# Patient Record
Sex: MALE | Race: WHITE | NOT HISPANIC OR LATINO | Employment: UNEMPLOYED | URBAN - METROPOLITAN AREA
[De-identification: names, ages, dates, MRNs, and addresses within clinical notes are randomized per-mention and may not be internally consistent; named-entity substitution may affect disease eponyms.]

---

## 2018-06-08 ENCOUNTER — OFFICE VISIT (OUTPATIENT)
Dept: INTERNAL MEDICINE | Facility: CLINIC | Age: 58
End: 2018-06-08
Payer: MEDICAID

## 2018-06-08 VITALS
RESPIRATION RATE: 16 BRPM | DIASTOLIC BLOOD PRESSURE: 66 MMHG | OXYGEN SATURATION: 96 % | HEIGHT: 70 IN | TEMPERATURE: 98.1 F | WEIGHT: 161.1 LBS | BODY MASS INDEX: 23.06 KG/M2 | SYSTOLIC BLOOD PRESSURE: 106 MMHG | HEART RATE: 95 BPM

## 2018-06-08 DIAGNOSIS — Z72.0 TOBACCO ABUSE: ICD-10-CM

## 2018-06-08 DIAGNOSIS — F60.3 BORDERLINE PERSONALITY DISORDER (H): ICD-10-CM

## 2018-06-08 DIAGNOSIS — K21.9 GASTROESOPHAGEAL REFLUX DISEASE WITHOUT ESOPHAGITIS: ICD-10-CM

## 2018-06-08 DIAGNOSIS — Z13.6 CARDIOVASCULAR SCREENING; LDL GOAL LESS THAN 160: ICD-10-CM

## 2018-06-08 DIAGNOSIS — F10.11 ALCOHOL ABUSE, IN REMISSION: ICD-10-CM

## 2018-06-08 DIAGNOSIS — Z12.11 COLON CANCER SCREENING: ICD-10-CM

## 2018-06-08 DIAGNOSIS — J43.9 PULMONARY EMPHYSEMA, UNSPECIFIED EMPHYSEMA TYPE (H): Primary | ICD-10-CM

## 2018-06-08 DIAGNOSIS — F25.0 SCHIZOAFFECTIVE DISORDER, BIPOLAR TYPE (H): ICD-10-CM

## 2018-06-08 PROBLEM — F10.10 ALCOHOL ABUSE: Status: ACTIVE | Noted: 2018-01-31

## 2018-06-08 PROBLEM — J44.9 COPD MIXED TYPE (H): Status: ACTIVE | Noted: 2018-01-10

## 2018-06-08 LAB
FEF 25/75: NORMAL
FEV-1: NORMAL
FEV1/FVC: NORMAL
FVC: NORMAL

## 2018-06-08 PROCEDURE — 99204 OFFICE O/P NEW MOD 45 MIN: CPT | Mod: 25 | Performed by: INTERNAL MEDICINE

## 2018-06-08 PROCEDURE — 94010 BREATHING CAPACITY TEST: CPT | Performed by: INTERNAL MEDICINE

## 2018-06-08 RX ORDER — NICOTINE POLACRILEX 4 MG/1
20 GUM, CHEWING ORAL DAILY
Qty: 30 TABLET | Refills: 11 | Status: SHIPPED | OUTPATIENT
Start: 2018-06-08 | End: 2019-07-11

## 2018-06-08 RX ORDER — ALBUTEROL SULFATE 90 UG/1
1-2 AEROSOL, METERED RESPIRATORY (INHALATION) EVERY 6 HOURS PRN
Qty: 1 INHALER | Refills: 11 | Status: SHIPPED | OUTPATIENT
Start: 2018-06-08 | End: 2019-08-05

## 2018-06-08 RX ORDER — TRIAMCINOLONE ACETONIDE 1 MG/G
CREAM TOPICAL
COMMUNITY
Start: 2018-02-14

## 2018-06-08 RX ORDER — ALBUTEROL SULFATE 90 UG/1
1-2 AEROSOL, METERED RESPIRATORY (INHALATION)
COMMUNITY
Start: 2018-04-25 | End: 2018-06-08

## 2018-06-08 RX ORDER — ARIPIPRAZOLE 10 MG/1
10 TABLET ORAL
COMMUNITY
Start: 2018-04-24

## 2018-06-08 RX ORDER — AMITRIPTYLINE HYDROCHLORIDE 100 MG/1
100 TABLET ORAL
COMMUNITY
Start: 2018-04-24

## 2018-06-08 NOTE — PROGRESS NOTES
SUBJECTIVE:   Sandro Arndt is a 57 year old male who presents to clinic today for the following health issues:    Patient is new to our clinic today.  He is been recently admitted to a new group home in the [facility].  States he is long standing history of mental health  Problems, substance abuse homelessness.  He has been in and out of [facility], different group homes over the last 8 years.    He has no acute complaints today he was recently seen in April had an [facility] for a pre-admission physical.  Review of that note seems as if it was quite brief examination.     Past Medical History:   Diagnosis Date     Borderline high cholesterol      Chronic neck pain     s/p traumatic fracture     Polysubstance abuse     CD Rx x 2     Pulmonary function studies abnormal     abn. DCLO     Schizoaffective disorder, bipolar type (H)      Tobacco abuse      Past Surgical History:   Procedure Laterality Date     C OPEN RX MANDIBLE FX       Current Outpatient Prescriptions   Medication Sig Dispense Refill     albuterol (PROAIR HFA/PROVENTIL HFA/VENTOLIN HFA) 108 (90 Base) MCG/ACT Inhaler Inhale 1-2 puffs into the lungs       amitriptyline (ELAVIL) 100 MG tablet Take 100 mg by mouth       ARIPiprazole (ABILIFY) 10 MG tablet Take 10 mg by mouth       LAMICTAL 100 MG PO TABS 1 TABLET DAILY       LamoTRIgine (LAMICTAL PO) Take 200 mg by mouth At Bedtime       OMEPRAZOLE 20 MG PO TBEC 1 TABLET DAILY       tiotropium (SPIRIVA RESPIMAT) 2.5 MCG/ACT inhalation aerosol Inhale 2 puffs into the lungs daily 4 g 11     TRAZODONE HCL PO Take 100 mg by mouth At Bedtime       triamcinolone (KENALOG) 0.1 % cream Apply  topically to affected area(s) 2 times daily.       Allergies as of 06/08/2018 - Crescencio as Reviewed 06/08/2018   Allergen Reaction Noted     Codeine Hives 04/23/2010     Ibuprofen GI Disturbance 01/10/2018     Neosporin Rash 04/23/2010     Amoxicillin Swelling, Rash, and Nausea and Vomiting 10/12/2015       Social History  "    Social History     Marital status: Single     Spouse name: N/A     Number of children: N/A     Years of education: N/A     Occupational History     Not on file.     Social History Main Topics     Smoking status: Current Every Day Smoker     Packs/day: 0.50     Years: 30.00     Types: Cigarettes     Smokeless tobacco: Never Used     Alcohol use No     Drug use: No     Sexual activity: Yes     Partners: Female     Other Topics Concern     Not on file     Social History Narrative       Family History   Problem Relation Age of Onset     Psychotic Disorder Mother      Depression Mother        Problem list and histories reviewed & adjusted, as indicated.  Additional history: as documented    Labs reviewed in EPIC    Reviewed and updated as needed this visit by clinical staff  Allergies  Meds       Reviewed and updated as needed this visit by Provider         ROS:  Constitutional, neuro, ENT, endocrine, pulmonary, cardiac, gastrointestinal, genitourinary, musculoskeletal, integument and psychiatric systems are negative, except as otherwise noted.    OBJECTIVE:                                                    /66  Pulse 95  Temp 98.1  F (36.7  C) (Oral)  Resp 16  Ht 5' 9.5\" (1.765 m)  Wt 161 lb 1.6 oz (73.1 kg)  SpO2 96%  BMI 23.45 kg/m2  Body mass index is 23.45 kg/(m^2).  GENERAL APPEARANCE: alert and no distress  HENT: nose and mouth without ulcers or lesions  NECK: no adenopathy, no asymmetry, masses, or scars and thyroid normal to palpation  RESP: lungs clear to auscultation - no rales, rhonchi or wheezes  CV: regular rates and rhythm, normal S1 S2, no S3 or S4 and no murmur, click or rub  MS: extremities normal- no gross deformities noted  SKIN: no suspicious lesions or rashes    Diagnostic test results:  PFTs: Moderate obstruction     ASSESSMENT/PLAN:                                                    1. Pulmonary emphysema, unspecified emphysema type (H)  Rather than use the Atrovent 4 times a " day will substitute Spiriva and continue the albuterol as needed.  Encourage smoking cessation as well.  - tiotropium (SPIRIVA RESPIMAT) 2.5 MCG/ACT inhalation aerosol; Inhale 2 puffs into the lungs daily  Dispense: 4 g; Refill: 11    Alcohol abuse, in remission  Borderline personality disorder  Schizoaffective disorder, bipolar type (H)  Tobacco abuse   - per psychiatry    Colon Ca screening  - colonoscopy     Cory Gleason MD  [facility]

## 2018-06-08 NOTE — MR AVS SNAPSHOT
"              After Visit Summary   6/8/2018    Sandro Arndt    MRN: [id number]           Patient Information     Date Of Birth          1960        Visit Information        Provider Department      6/8/2018 11:20 AM Cory Gleason MD [facility]        Today's Diagnoses     Pulmonary emphysema, unspecified emphysema type (H)    -  1       Follow-ups after your visit        Who to contact     If you have questions or need follow up information about today's clinic visit or your schedule please contact [facility] directly at [phone].  Normal or non-critical lab and imaging results will be communicated to you by MyChart, letter or phone within 4 business days after the clinic has received the results. If you do not hear from us within 7 days, please contact the clinic through MyChart or phone. If you have a critical or abnormal lab result, we will notify you by phone as soon as possible.  Submit refill requests through "[employer] or call your pharmacy and they will forward the refill request to us. Please allow 3 business days for your refill to be completed.          Additional Information About Your Visit        Care EveryWhere ID     This is your Care EveryWhere ID. This could be used by other organizations to access your [place] medical records  [id number]        Your Vitals Were     Pulse Temperature Respirations Height Pulse Oximetry BMI (Body Mass Index)    95 98.1  F (36.7  C) (Oral) 16 5' 9.5\" (1.765 m) 96% 23.45 kg/m2       Blood Pressure from Last 3 Encounters:   06/08/18 106/66   06/30/12 118/79   04/23/10 152/77    Weight from Last 3 Encounters:   06/08/18 161 lb 1.6 oz (73.1 kg)   06/30/12 138 lb (62.6 kg)   04/23/10 142 lb (64.4 kg)              We Performed the Following     Spirometry, Breathing Capacity: Normal Order, Clinic Performed          Today's Medication Changes          These changes are accurate as of 6/8/18 12:29 PM.  If " you have any questions, ask your nurse or doctor.               Start taking these medicines.        Dose/Directions    tiotropium 2.5 MCG/ACT inhalation aerosol   Commonly known as:  SPIRIVA RESPIMAT   Used for:  Pulmonary emphysema, unspecified emphysema type (H)   Started by:  Cory Gleason MD        Dose:  2 puff   Inhale 2 puffs into the lungs daily   Quantity:  4 g   Refills:  11         Stop taking these medicines if you haven't already. Please contact your care team if you have questions.     ipratropium 17 MCG/ACT Inhaler   Commonly known as:  ATROVENT HFA   Stopped by:  Cory Gleason MD                Where to get your medicines      These medications were sent to [facility] [address]  [address] [address]     Phone:  [phone]     tiotropium 2.5 MCG/ACT inhalation aerosol                Primary Care Provider Office Phone # Fax #    Yousufsilvina Bennie Sosa [phone]       [address] [address]        Equal Access to Services     [facility]: Hadii aad ku hadasho Soomaali, waaxda luqadaha, qaybta kaalmada adeegyada, waxay idiin hayrenann zbigniew owen . So [facility] [phone].    ATENCIÓN: Si habla español, tiene a jorgensen disposición servicios gratuitos de asistencia lingüística. Llame al [phone].    We comply with applicable federal civil rights laws and [place] laws. We do not discriminate on the basis of race, color, national origin, age, disability, sex, sexual orientation, or gender identity.            Thank you!     Thank you for choosing [facility]  for your care. Our goal is always to provide you with excellent care. Hearing back from our patients is one way we can continue to improve our services. Please take a few minutes to complete the written survey that you may receive in the mail after your visit with us. Thank you!             Your  Updated Medication List - Protect others around you: Learn how to safely use, store and throw away your medicines at www.disposemymeds.org.          This list is accurate as of 6/8/18 12:29 PM.  Always use your most recent med list.                   Brand Name Dispense Instructions for use Diagnosis    albuterol 108 (90 Base) MCG/ACT Inhaler    PROAIR HFA/PROVENTIL HFA/VENTOLIN HFA     Inhale 1-2 puffs into the lungs        amitriptyline 100 MG tablet    ELAVIL     Take 100 mg by mouth        ARIPiprazole 10 MG tablet    ABILIFY     Take 10 mg by mouth        * LAMICTAL PO      Take 200 mg by mouth At Bedtime        * LAMICTAL 100 MG tablet   Generic drug:  lamoTRIgine      1 TABLET DAILY        omeprazole 20 MG tablet      1 TABLET DAILY        tiotropium 2.5 MCG/ACT inhalation aerosol    SPIRIVA RESPIMAT    4 g    Inhale 2 puffs into the lungs daily    Pulmonary emphysema, unspecified emphysema type (H)       TRAZODONE HCL PO      Take 100 mg by mouth At Bedtime        triamcinolone 0.1 % cream    KENALOG     Apply  topically to affected area(s) 2 times daily.        * Notice:  This list has 2 medication(s) that are the same as other medications prescribed for you. Read the directions carefully, and ask your doctor or other care provider to review them with you.

## 2018-06-11 ENCOUNTER — TELEPHONE (OUTPATIENT)
Dept: NURSING | Facility: CLINIC | Age: 58
End: 2018-06-11

## 2018-06-11 ENCOUNTER — HOSPITAL ENCOUNTER (OUTPATIENT)
Facility: CLINIC | Age: 58
End: 2018-06-11
Attending: COLON & RECTAL SURGERY | Admitting: COLON & RECTAL SURGERY
Payer: MEDICAID

## 2018-06-11 ENCOUNTER — TELEPHONE (OUTPATIENT)
Dept: INTERNAL MEDICINE | Facility: CLINIC | Age: 58
End: 2018-06-11

## 2018-06-11 DIAGNOSIS — J43.9 PULMONARY EMPHYSEMA, UNSPECIFIED EMPHYSEMA TYPE (H): ICD-10-CM

## 2018-06-11 RX ORDER — TIOTROPIUM BROMIDE 18 UG/1
CAPSULE ORAL; RESPIRATORY (INHALATION)
Qty: 30 CAPSULE | Refills: 11 | Status: SHIPPED | OUTPATIENT
Start: 2018-06-11 | End: 2019-06-26

## 2018-06-11 NOTE — TELEPHONE ENCOUNTER
[facility] called to get an alternative inhaler for spiriva which was denied by insurance.  Please call back at [phone].

## 2018-06-11 NOTE — TELEPHONE ENCOUNTER
Patient had appointment with Dr. Gleason on 6/8/18.  PHILIP from [facility] calling today.  Patient is needing physical admission paperwork filled out.  Staff contacted Kerry as it was mentioned he had a physical at the [facility] on 4/27/18.  However, Kerry reported to group home staff patient did NOT have a physical.  Scheduled physical appointment on 4/27/18 was actually  cancelled on 4/24/18.  Physical admission paperwork needs to be completed 72 hours from date of admission.  Group home needing paperwork filled out ASAP as now its been over 72 hours.  CJ faxed over paperwork and wondering if Dr. Gleason could fill out paper work for patient based on appointment from 6/8/18.  Please advise.      [facility] # [phone]

## 2018-06-11 NOTE — TELEPHONE ENCOUNTER
Central Prior Authorization Team   Phone: [phone]      PA Initiation    Medication: spiriva respimat 2.5 mcg in  Insurance Company: [place] Medicaid ([facility]) - Phone [phone] Fax [phone]  Pharmacy Filling the Rx: [facility] - [place], [place] - [address]  Filling Pharmacy Phone: [phone]  Filling Pharmacy Fax: [phone]  Start Date: 6/11/2018

## 2018-06-11 NOTE — TELEPHONE ENCOUNTER
Prior Authorization Retail Medication Request    Medication/Dose: spiriva respimat 2.5 mcg in  ICD code (if different than what is on RX):  J43.9  Previously Tried and Failed:    Rationale:      Insurance Name:  Medicaid -[id number]  Insurance ID:  [id number]      Pharmacy Information (if different than what is on RX)  Name:  [facility] [address] [facility]  Phone:  [phone]

## 2018-06-11 NOTE — TELEPHONE ENCOUNTER
PRIOR AUTHORIZATION DENIED    Medication: spiriva respimat 2.5 mcg in - Denied    Denial Date: 6/11/2018    Denial Rational:  Must try/fail Spiriva handihaler    Appeal Information:    If you would like to appeal, please supply P/A team with a letter of medical necessity with clinical reason.

## 2018-06-15 ENCOUNTER — MEDICAL CORRESPONDENCE (OUTPATIENT)
Dept: HEALTH INFORMATION MANAGEMENT | Facility: CLINIC | Age: 58
End: 2018-06-15

## 2018-06-15 NOTE — TELEPHONE ENCOUNTER
"Actually had appt 4/25 with a Dr. Zacarias- see the encounter for \"physical- for board and care facility\"   So they are wrong- he had the exam. But I did fill out the paper work based on what I did.  "

## 2018-06-18 NOTE — TELEPHONE ENCOUNTER
[facility] At FAX: [phone], orig for chart

## 2018-07-01 RX ORDER — LIDOCAINE 40 MG/G
CREAM TOPICAL
Status: CANCELLED | OUTPATIENT
Start: 2018-07-01

## 2018-07-01 RX ORDER — ONDANSETRON 2 MG/ML
4 INJECTION INTRAMUSCULAR; INTRAVENOUS
Status: CANCELLED | OUTPATIENT
Start: 2018-07-01

## 2018-08-30 ENCOUNTER — TELEPHONE (OUTPATIENT)
Dept: INTERNAL MEDICINE | Facility: CLINIC | Age: 58
End: 2018-08-30

## 2019-01-29 ENCOUNTER — OFFICE VISIT (OUTPATIENT)
Dept: INTERNAL MEDICINE | Facility: CLINIC | Age: 59
End: 2019-01-29
Payer: COMMERCIAL

## 2019-01-29 VITALS
DIASTOLIC BLOOD PRESSURE: 76 MMHG | HEART RATE: 93 BPM | WEIGHT: 161.1 LBS | SYSTOLIC BLOOD PRESSURE: 108 MMHG | BODY MASS INDEX: 23.06 KG/M2 | RESPIRATION RATE: 16 BRPM | HEIGHT: 70 IN | TEMPERATURE: 98 F | OXYGEN SATURATION: 98 %

## 2019-01-29 DIAGNOSIS — Z11.4 SCREENING FOR HIV (HUMAN IMMUNODEFICIENCY VIRUS): ICD-10-CM

## 2019-01-29 DIAGNOSIS — Z13.220 LIPID SCREENING: ICD-10-CM

## 2019-01-29 DIAGNOSIS — Z12.11 SCREEN FOR COLON CANCER: ICD-10-CM

## 2019-01-29 DIAGNOSIS — S39.012A LOW BACK STRAIN, INITIAL ENCOUNTER: Primary | ICD-10-CM

## 2019-01-29 DIAGNOSIS — Z13.6 CARDIOVASCULAR SCREENING; LDL GOAL LESS THAN 160: ICD-10-CM

## 2019-01-29 DIAGNOSIS — Z11.59 NEED FOR HEPATITIS C SCREENING TEST: ICD-10-CM

## 2019-01-29 PROCEDURE — 99213 OFFICE O/P EST LOW 20 MIN: CPT | Performed by: INTERNAL MEDICINE

## 2019-01-29 ASSESSMENT — MIFFLIN-ST. JEOR: SCORE: 1559.05

## 2019-01-29 NOTE — PROGRESS NOTES
"  SUBJECTIVE:   Sandro Arndt is a 58 year old male who presents to clinic today for the following health issues:    Patient states that last Wednesday he had a sharp pain across the lower part of his back but now it feels better and is just a dull ache.  No radicular component. Pain mostly with movement.     Problem list and histories reviewed & adjusted, as indicated.  Additional history: as documented    Labs reviewed in EPIC    Reviewed and updated as needed this visit by clinical staff  Allergies  Meds       Reviewed and updated as needed this visit by Provider         ROS:  Constitutional, HEENT, cardiovascular, pulmonary, gi and gu systems are negative, except as otherwise noted.    OBJECTIVE:                                                    /76   Pulse 93   Temp 98  F (36.7  C) (Oral)   Resp 16   Ht 1.765 m (5' 9.5\")   Wt 73.1 kg (161 lb 1.6 oz)   SpO2 98%   BMI 23.45 kg/m    Body mass index is 23.45 kg/m .  GENERAL APPEARANCE: alert and no distress  RESP: lungs clear to auscultation - no rales, rhonchi or wheezes  CV: regular rates and rhythm, normal S1 S2, no S3 or S4 and no murmur, click or rub  Comprehensive back pain exam:  Tenderness of (mild) paraspinal muscles on R lower T and L spine region, Range of motion not limited by pain, Lower extremity strength functional and equal on both sides, Lower extremity reflexes within normal limits bilaterally and Lower extremity sensation normal and equal on both sides    Diagnostic test results:  none      ASSESSMENT/PLAN:                                                    1. Low back strain, initial encounter  Mild strain  Use prn ibuprofen, tylenol on standing orders at [facility] home  Daily stretching    2. Screen for colon cancer  - GASTROENTEROLOGY ADULT REF PROCEDURE ONLY Maximo Godoy [phone]; No Provider Preference    3. Need for hepatitis C screening test  - Hepatitis C Screen Reflex to HCV RNA Quant and Genotype; Future    4. " Screening for HIV (human immunodeficiency virus)  - HIV Screening; Future    5. Lipid screening  - Lipid panel reflex to direct LDL Fasting; Future           Cory Gleason MD  [facility]

## 2019-04-17 ENCOUNTER — HOSPITAL ENCOUNTER (OUTPATIENT)
Facility: CLINIC | Age: 59
End: 2019-04-17
Attending: SPECIALIST | Admitting: SPECIALIST
Payer: COMMERCIAL

## 2019-04-17 ENCOUNTER — OFFICE VISIT (OUTPATIENT)
Dept: INTERNAL MEDICINE | Facility: CLINIC | Age: 59
End: 2019-04-17
Payer: COMMERCIAL

## 2019-04-17 VITALS
BODY MASS INDEX: 24.12 KG/M2 | DIASTOLIC BLOOD PRESSURE: 86 MMHG | HEART RATE: 104 BPM | RESPIRATION RATE: 20 BRPM | WEIGHT: 165.7 LBS | OXYGEN SATURATION: 97 % | TEMPERATURE: 97.9 F | SYSTOLIC BLOOD PRESSURE: 122 MMHG

## 2019-04-17 DIAGNOSIS — J43.9 PULMONARY EMPHYSEMA, UNSPECIFIED EMPHYSEMA TYPE (H): ICD-10-CM

## 2019-04-17 DIAGNOSIS — F25.0 SCHIZOAFFECTIVE DISORDER, BIPOLAR TYPE (H): Primary | ICD-10-CM

## 2019-04-17 DIAGNOSIS — Z12.11 SCREEN FOR COLON CANCER: ICD-10-CM

## 2019-04-17 PROCEDURE — 99213 OFFICE O/P EST LOW 20 MIN: CPT | Performed by: INTERNAL MEDICINE

## 2019-04-17 RX ORDER — ALBUTEROL SULFATE 90 UG/1
1-2 AEROSOL, METERED RESPIRATORY (INHALATION) EVERY 6 HOURS PRN
Status: CANCELLED | OUTPATIENT
Start: 2019-04-17

## 2019-04-17 NOTE — PROGRESS NOTES
SUBJECTIVE:   Sandro Arndt is a 58 year old male who presents to clinic today for the following   health issues:     Patient would like to discuss starting Verutum for male enhancement. Group home needs the OK before pt can start.     Patient states he found some supplement on the Internet that he brought.  The sole purpose of this supplement was to enlarge his penis and delayed ejaculation.  According to his group home he needs our approval to use such a medication/supplement.    Additional history: as documented    Reviewed  and updated as needed this visit by clinical staff         Reviewed and updated as needed this visit by Provider         Labs reviewed in EPIC    ROS:  Constitutional, HEENT, cardiovascular, pulmonary, gi and gu systems are negative, except as otherwise noted.    OBJECTIVE:                                                    /86   Pulse 104   Temp 97.9  F (36.6  C) (Oral)   Resp 20   Wt 75.2 kg (165 lb 11.2 oz)   SpO2 97%   BMI 24.12 kg/m    Body mass index is 24.12 kg/m .  GENERAL APPEARANCE: alert and no distress    Diagnostic test results:  none      ASSESSMENT/PLAN:                                                    1.  Consult for use of herbal supplement  2. Schizoaffective disorder    -This product has multiple herbal ingredients.  Some of which cannot be found on any database.  Cannot verify the safety of such ingredients nor possibility for any interaction with his current medications.  Recommended he not waste his money on such a product      Cory Gleason MD  [facility]

## 2019-05-29 DIAGNOSIS — K21.9 GASTROESOPHAGEAL REFLUX DISEASE WITHOUT ESOPHAGITIS: Primary | ICD-10-CM

## 2019-06-11 ENCOUNTER — OFFICE VISIT (OUTPATIENT)
Dept: INTERNAL MEDICINE | Facility: CLINIC | Age: 59
End: 2019-06-11
Payer: COMMERCIAL

## 2019-06-11 VITALS
DIASTOLIC BLOOD PRESSURE: 74 MMHG | TEMPERATURE: 98.8 F | SYSTOLIC BLOOD PRESSURE: 120 MMHG | WEIGHT: 169.7 LBS | RESPIRATION RATE: 16 BRPM | OXYGEN SATURATION: 96 % | BODY MASS INDEX: 25.13 KG/M2 | HEART RATE: 94 BPM | HEIGHT: 69 IN

## 2019-06-11 DIAGNOSIS — Z11.4 SCREENING FOR HIV (HUMAN IMMUNODEFICIENCY VIRUS): ICD-10-CM

## 2019-06-11 DIAGNOSIS — Z87.891 PERSONAL HISTORY OF TOBACCO USE: ICD-10-CM

## 2019-06-11 DIAGNOSIS — Z00.00 ROUTINE GENERAL MEDICAL EXAMINATION AT A HEALTH CARE FACILITY: Primary | ICD-10-CM

## 2019-06-11 DIAGNOSIS — Z23 NEED FOR VACCINATION: ICD-10-CM

## 2019-06-11 DIAGNOSIS — Z12.5 SCREENING FOR PROSTATE CANCER: ICD-10-CM

## 2019-06-11 DIAGNOSIS — Z13.6 CARDIOVASCULAR SCREENING; LDL GOAL LESS THAN 160: ICD-10-CM

## 2019-06-11 DIAGNOSIS — L98.9 SKIN LESION: ICD-10-CM

## 2019-06-11 DIAGNOSIS — Z11.59 NEED FOR HEPATITIS C SCREENING TEST: ICD-10-CM

## 2019-06-11 DIAGNOSIS — Z12.11 SCREEN FOR COLON CANCER: ICD-10-CM

## 2019-06-11 DIAGNOSIS — F25.0 SCHIZOAFFECTIVE DISORDER, BIPOLAR TYPE (H): ICD-10-CM

## 2019-06-11 LAB
ANION GAP SERPL CALCULATED.3IONS-SCNC: 5 MMOL/L (ref 3–14)
BUN SERPL-MCNC: 13 MG/DL (ref 7–30)
CALCIUM SERPL-MCNC: 9.6 MG/DL (ref 8.5–10.1)
CHLORIDE SERPL-SCNC: 106 MMOL/L (ref 94–109)
CHOLEST SERPL-MCNC: 230 MG/DL
CO2 SERPL-SCNC: 31 MMOL/L (ref 20–32)
CREAT SERPL-MCNC: 1.19 MG/DL (ref 0.66–1.25)
GFR SERPL CREATININE-BSD FRML MDRD: 68 ML/MIN/{1.73_M2}
GLUCOSE SERPL-MCNC: 98 MG/DL (ref 70–99)
HCV AB SERPL QL IA: NONREACTIVE
HDLC SERPL-MCNC: 55 MG/DL
HIV 1+2 AB+HIV1 P24 AG SERPL QL IA: NONREACTIVE
LDLC SERPL CALC-MCNC: 142 MG/DL
NONHDLC SERPL-MCNC: 175 MG/DL
POTASSIUM SERPL-SCNC: 4.2 MMOL/L (ref 3.4–5.3)
PSA SERPL-ACNC: 1.1 UG/L (ref 0–4)
SODIUM SERPL-SCNC: 142 MMOL/L (ref 133–144)
TRIGL SERPL-MCNC: 164 MG/DL

## 2019-06-11 PROCEDURE — 90471 IMMUNIZATION ADMIN: CPT | Performed by: INTERNAL MEDICINE

## 2019-06-11 PROCEDURE — G0103 PSA SCREENING: HCPCS | Performed by: INTERNAL MEDICINE

## 2019-06-11 PROCEDURE — 90732 PPSV23 VACC 2 YRS+ SUBQ/IM: CPT | Performed by: INTERNAL MEDICINE

## 2019-06-11 PROCEDURE — 80048 BASIC METABOLIC PNL TOTAL CA: CPT | Performed by: INTERNAL MEDICINE

## 2019-06-11 PROCEDURE — 36415 COLL VENOUS BLD VENIPUNCTURE: CPT | Performed by: INTERNAL MEDICINE

## 2019-06-11 PROCEDURE — 99396 PREV VISIT EST AGE 40-64: CPT | Mod: 25 | Performed by: INTERNAL MEDICINE

## 2019-06-11 PROCEDURE — 80061 LIPID PANEL: CPT | Performed by: INTERNAL MEDICINE

## 2019-06-11 PROCEDURE — 86803 HEPATITIS C AB TEST: CPT | Performed by: INTERNAL MEDICINE

## 2019-06-11 PROCEDURE — 87389 HIV-1 AG W/HIV-1&-2 AB AG IA: CPT | Performed by: INTERNAL MEDICINE

## 2019-06-11 ASSESSMENT — ENCOUNTER SYMPTOMS
ARTHRALGIAS: 0
FREQUENCY: 0
CHILLS: 0
NERVOUS/ANXIOUS: 0
JOINT SWELLING: 0
CONSTIPATION: 0
MYALGIAS: 0
FEVER: 0
WEAKNESS: 0
HEADACHES: 0
DECREASED CONCENTRATION: 1
COUGH: 0
HEMATOCHEZIA: 0
ABDOMINAL PAIN: 0
EYE PAIN: 0
NAUSEA: 0
PARESTHESIAS: 0
SHORTNESS OF BREATH: 0
DIARRHEA: 0
HEARTBURN: 0
DYSURIA: 0
HEMATURIA: 0
DIZZINESS: 0
SORE THROAT: 0
PALPITATIONS: 0

## 2019-06-11 ASSESSMENT — MIFFLIN-ST. JEOR: SCORE: 1594.09

## 2019-06-11 ASSESSMENT — PATIENT HEALTH QUESTIONNAIRE - PHQ9: SUM OF ALL RESPONSES TO PHQ QUESTIONS 1-9: 9

## 2019-06-11 NOTE — LETTER
June 18, 2019      TevniRM Arndt  [address] [facility] [address]        Dear ,    We are writing to inform you of your test results.    Using the new American Heart Association risk calculator your 10 yr risk of global cardiovascular disease (heart attack, stroke) is 12%.  At any risk level > 7.5-10% we should consider the use of a statin cholesterol lowering medication.  This has been shown to help prevent heart disease and strokes when risk is at or above this level.    Please make an appointment with your provider to review or follow up on your test results.  Appointments can be made by calling the # above.    Resulted Orders   Hepatitis C Screen Reflex to HCV RNA Quant and Genotype   Result Value Ref Range    Hepatitis C Antibody Nonreactive NR^Nonreactive      Comment:      Assay performance characteristics have not been established for newborns,   infants, and children     Basic metabolic panel   Result Value Ref Range    Sodium 142 133 - 144 mmol/L    Potassium 4.2 3.4 - 5.3 mmol/L    Chloride 106 94 - 109 mmol/L    Carbon Dioxide 31 20 - 32 mmol/L    Anion Gap 5 3 - 14 mmol/L    Glucose 98 70 - 99 mg/dL    Urea Nitrogen 13 7 - 30 mg/dL    Creatinine 1.19 0.66 - 1.25 mg/dL    GFR Estimate 68 >60 mL/min/[1.73_m2]      Comment:      Non  GFR Calc  Starting 12/18/2018, serum creatinine based estimated GFR (eGFR) will be   calculated using the Chronic Kidney Disease Epidemiology Collaboration   (CKD-EPI) equation.      GFR Estimate If Black 78 >60 mL/min/[1.73_m2]      Comment:       GFR Calc  Starting 12/18/2018, serum creatinine based estimated GFR (eGFR) will be   calculated using the Chronic Kidney Disease Epidemiology Collaboration   (CKD-EPI) equation.      Calcium 9.6 8.5 - 10.1 mg/dL   Lipid panel reflex to direct LDL Fasting   Result Value Ref Range    Cholesterol 230 (H) <200 mg/dL      Comment:      Desirable:       <200 mg/dl    Triglycerides 164 (H)  <150 mg/dL      Comment:      Borderline high:  150-199 mg/dl  High:             200-499 mg/dl  Very high:       >499 mg/dl      HDL Cholesterol 55 >39 mg/dL    LDL Cholesterol Calculated 142 (H) <100 mg/dL      Comment:      Above desirable:  100-129 mg/dl  Borderline High:  130-159 mg/dL  High:             160-189 mg/dL  Very high:       >189 mg/dl      Non HDL Cholesterol 175 (H) <130 mg/dL      Comment:      Above Desirable:  130-159 mg/dl  Borderline high:  160-189 mg/dl  High:             190-219 mg/dl  Very high:       >219 mg/dl     PSA, screen   Result Value Ref Range    PSA 1.10 0 - 4 ug/L      Comment:      Assay Method:  Chemiluminescence using Siemens Vista analyzer   HIV Screening   Result Value Ref Range    HIV Antigen Antibody Combo Nonreactive NR^Nonreactive          Comment:      HIV-1 p24 Ag & HIV-1/HIV-2 Ab Not Detected       If you have any questions or concerns, please call the clinic at the number listed above.       Sincerely,        Cory Gleason MD

## 2019-06-11 NOTE — PATIENT INSTRUCTIONS
Preventive Health Recommendations  Male Ages 50 - 64    Yearly exam:             See your health care provider every year in order to  o   Review health changes.   o   Discuss preventive care.    o   Review your medicines if your doctor has prescribed any.     Have a cholesterol test every 5 years, or more frequently if you are at risk for high cholesterol/heart disease.     Have a diabetes test (fasting glucose) every three years. If you are at risk for diabetes, you should have this test more often.     Have a colonoscopy at age 50, or have a yearly FIT test (stool test). These exams will check for colon cancer.      Talk with your health care provider about whether or not a prostate cancer screening test (PSA) is right for you.    You should be tested each year for STDs (sexually transmitted diseases), if you re at risk.     Shots: Get a flu shot each year. Get a tetanus shot every 10 years.     Nutrition:    Eat at least 5 servings of fruits and vegetables daily.     Eat whole-grain bread, whole-wheat pasta and brown rice instead of white grains and rice.     Get adequate Calcium and Vitamin D.     Lifestyle    Exercise for at least 150 minutes a week (30 minutes a day, 5 days a week). This will help you control your weight and prevent disease.     Limit alcohol to one drink per day.     No smoking.     Wear sunscreen to prevent skin cancer.     See your dentist every six months for an exam and cleaning.     See your eye doctor every 1 to 2 years.    Lung Cancer Screening   Frequently Asked Questions  If you are at high-risk for lung cancer, getting screened with low-dose computed tomography (LDCT) every year can help save your life. This handout offers answers to some of the most common questions about lung cancer screening. If you have other questions, please call [phone] ([phone]).     What is it?  Lung cancer screening uses special X-ray technology to create an image of your lung tissue.  The exam is quick and easy and takes less than 10 seconds. We don t give you any medicine or use any needles. You can eat before and after the exam. You don t need to change your clothes as long as the clothing on your chest doesn t contain metal. But, you do need to be able to hold your breath for at least 6 seconds during the exam.    What is the goal of lung cancer screening?  The goal of lung cancer screening is to save lives. Many times, lung cancer is not found until a person starts having physical symptoms. Lung cancer screening can help detect lung cancer in the earliest stages when it may be easier to treat.    Who should be screened for lung cancer?  We suggest lung cancer screening for anyone who is at high-risk for lung cancer. You are in the high-risk group if you:      are between the ages of 55 and 79, and    have smoked at least 1 pack of cigarettes a day for 30 or more years, and    still smoke or have quit within the past 15 years.    However, if you have a new cough or shortness of breath, you should talk to your doctor before being screened.    Some national lung health advocacy groups also recommend screening for people ages 50 to 79 who have smoked an average of 1 pack of cigarettes a day for 20 years. They must also have at least 1 other risk factor for lung cancer, not including exposure to secondhand smoke. Other risk factors are having had cancer in the past, emphysema, pulmonary fibrosis, COPD, a family history of lung cancer, or exposure to certain materials such as arsenic, asbestos, beryllium, cadmium, chromium, diesel fumes, nickel, radon or silica. Your care team can help you know if you have one of these risk factors.     Why does it matter if I have symptoms?  Certain symptoms can be a sign that you have a condition in your lungs that should be checked and treated by your doctor. These symptoms include fever, chest pain, a new or changing cough, shortness of breath that you have  never felt before, coughing up blood or unexplained weight loss. Having any of these symptoms can greatly affect the results of lung cancer screening.       Should all smokers get an LDCT lung cancer screening exam?  It depends. Lung cancer screening is for a very specific group of men and women who have a history of heavy smoking over a long period of time (see  Who should be screened for lung cancer  above).  I am in the high-risk group, but have been diagnosed with cancer in the past. Is LDCT lung cancer screening right for me?  In some cases, you should not have LDCT lung screening, such as when your doctor is already following your cancer with CT scan studies. Your doctor will help you decide if LDCT lung screening is right for you.  Do I need to have a screening exam every year?  Yes. If you are in the high-risk group described earlier, you should get an LDCT lung cancer screening exam every year until you are 79, or are no longer willing or able to undergo screening and possible procedures to diagnose and treat lung cancer.  How effective is LDCT at preventing death from lung cancer?  Studies have shown that LDCT lung cancer screening can lower the risk of death from lung cancer by 20 percent in people who are at high-risk.  What are the risks?  There are some risks and limitations of LDCT lung cancer screening. We want to make sure you understand the risks and benefits, so please let us know if you have any questions. Your doctor may want to talk with you more about these risks.    Radiation exposure: As with any exam that uses radiation, there is a very small increased risk of cancer. The amount of radiation in LDCT is small--about the same amount a person would get from a mammogram. Your doctor orders the exam when he or she feels the potential benefits outweigh the risks.    False negatives: No test is perfect, including LDCT. It is possible that you may have a medical condition, including lung cancer,  that is not found during your exam. This is called a false negative result.    False positives and more testing: LDCT very often finds something in the lung that could be cancer, but in fact is not. This is called a false positive result. False positive tests often cause anxiety. To make sure these findings are not cancer, you may need to have more tests. These tests will be done only if you give us permission. Sometimes patients need a treatment that can have side effects, such as a biopsy. For more information on false positives, see  What can I expect from the results?     Findings not related to lung cancer: Your LDCT exam also takes pictures of areas of your body next to your lungs. In a very small number of cases, the CT scan will show an abnormal finding in one of these areas, such as your kidneys, adrenal glands, liver or thyroid. This finding may not be serious, but you may need more tests. Your doctor can help you decide what other tests you may need, if any.  What can I expect from the results?  About 1 out of 4 LDCT exams will find something that may need more tests. Most of the time, these findings are lung nodules. Lung nodules are very small collections of tissue in the lung. These nodules are very common, and the vast majority--more than 97 percent--are not cancer (benign). Most are normal lymph nodes or small areas of scarring from past infections.  But, if a small lung nodule is found to be cancer, the cancer can be cured more than 90 percent of the time. To know if the nodule is cancer, we may need to get more images before your next yearly screening exam. If the nodule has suspicious features (for example, it is large, has an odd shape or grows over time), we will refer you to a specialist for further testing.  Will my doctor also get the results?  Yes. Your doctor will get a copy of your results.  Is it okay to keep smoking now that there s a cancer screening exam?  No. Tobacco is one of the  strongest cancer-causing agents. It causes not only lung cancer, but other cancers and cardiovascular (heart) diseases as well. The damage caused by smoking builds over time. This means that the longer you smoke, the higher your risk of disease. While it is never too late to quit, the sooner you quit, the better.  Where can I find help to quit smoking?  The best way to prevent lung cancer is to stop smoking. If you have already quit smoking, congratulations and keep it up! For help on quitting smoking, please call [employer] at 4-943-208-TYAC ([phone]) or the American Cancer Society at 1-976.503.8365 to find local resources near you.  One-on-one health coaching:  If you d prefer to work individually with a health care provider on tobacco cessation, we offer:      Medication Therapy Management:  Our specially trained pharmacists work closely with you and your doctor to help you quit smoking.  Call [phone] or [phone] (toll free).     Can Do: Health coaching offered by [facility].  [email or web address]

## 2019-06-11 NOTE — PROGRESS NOTES
SUBJECTIVE:   CC: Sandro Arndt is an 58 year old male who presents for preventative health visit.     Healthy Habits:    Getting at least 3 servings of Calcium per day:  NO    Bi-annual eye exam:  Yes    Dental care twice a year:  NO (once)    Sleep apnea or symptoms of sleep apnea:  None    Diet:  Regular (no restrictions)    Frequency of exercise:  2-3 days/week    Duration of exercise:  15-30 minutes    Taking medications regularly:  Yes    Barriers to taking medications:  None    Medication side effects:  None    PHQ-2 Total Score:    Additional concerns today:  No    Today's PHQ-2 Score:   PHQ-2 ( 1999 Pfizer) 1/29/2019   Q1: Little interest or pleasure in doing things 0   Q2: Feeling down, depressed or hopeless 0   PHQ-2 Score 0     Abuse: Current or Past(Physical, Sexual or Emotional)- No  Do you feel safe in your environment? Yes    Social History     Tobacco Use     Smoking status: Current Every Day Smoker     Packs/day: 0.50     Years: 30.00     Pack years: 15.00     Types: Cigarettes     Smokeless tobacco: Never Used   Substance Use Topics     Alcohol use: No     If you drink alcohol do you typically have >3 drinks per day or >7 drinks per week? Not applicable    No flowsheet data found.    Last PSA: No results found for: PSA    Reviewed orders with patient. Reviewed health maintenance and updated orders accordingly - Yes      Reviewed and updated as needed this visit by clinical staff  Tobacco  Allergies  Meds  Med Hx  Surg Hx  Fam Hx  Soc Hx        Reviewed and updated as needed this visit by Provider            Review of Systems   Constitutional: Negative for chills and fever.   HENT: Negative for congestion, ear pain, hearing loss and sore throat.    Eyes: Negative for pain and visual disturbance.   Respiratory: Negative for cough and shortness of breath.    Cardiovascular: Negative for chest pain, palpitations and peripheral edema.   Gastrointestinal: Negative for abdominal pain,  "constipation, diarrhea, heartburn, hematochezia and nausea.   Genitourinary: Negative for discharge, dysuria, frequency, genital sores, hematuria, impotence and urgency.   Musculoskeletal: Negative for arthralgias, joint swelling and myalgias.   Skin: Negative for rash.   Neurological: Negative for dizziness, weakness, headaches and paresthesias.   Psychiatric/Behavioral: Positive for decreased concentration. Negative for mood changes. The patient is not nervous/anxious.          OBJECTIVE:   /74   Pulse 94   Temp 98.8  F (37.1  C) (Temporal)   Resp 16   Ht 1.759 m (5' 9.25\")   Wt 77 kg (169 lb 11.2 oz)   SpO2 96%   BMI 24.88 kg/m      Physical Exam   Constitutional: He is oriented to person, place, and time. He appears well-developed and well-nourished. No distress.   HENT:   Right Ear: Tympanic membrane and external ear normal.   Left Ear: Tympanic membrane and external ear normal.   Nose: Nose normal.   Mouth/Throat: Oropharynx is clear and moist. No oral lesions. No oropharyngeal exudate.   Eyes: Pupils are equal, round, and reactive to light. Conjunctivae are normal. Right eye exhibits no discharge. Left eye exhibits no discharge.   Neck: Neck supple. No tracheal deviation present. No thyromegaly present.   Cardiovascular: Normal rate, regular rhythm, S1 normal, S2 normal, normal heart sounds and normal pulses. Exam reveals no S3 and no S4.   No murmur heard.  Pulmonary/Chest: Effort normal and breath sounds normal. No respiratory distress. He has no wheezes. He has no rales.   Abdominal: Soft. Bowel sounds are normal. He exhibits no mass. There is no hepatosplenomegaly. There is no tenderness.   Musculoskeletal: Normal range of motion. He exhibits no edema or deformity.   Lymphadenopathy:     He has no cervical adenopathy.   Neurological: He is alert and oriented to person, place, and time. He has normal strength and normal reflexes. He exhibits normal muscle tone.   Skin: Skin is warm and dry. " "Lesion noted. No rash noted.        Psychiatric: His speech is normal and behavior is normal.         Labs reviewed in Epic    ASSESSMENT/PLAN:   1. Routine general medical examination at a health care facility  Ca screenings due- colon, prostate and lung  immunikz- pneumovax  Smoking cessation recommended- unlikely to ever quit  CV risk assessment    2. Screen for colon cancer  Scheduled for June colonoscopy    3. Screening for HIV (human immunodeficiency virus)  - HIV Screening    4. CARDIOVASCULAR SCREENING; LDL GOAL LESS THAN 160  - Basic metabolic panel  - Lipid panel reflex to direct LDL Fasting    5. Need for hepatitis C screening test  - Hepatitis C Screen Reflex to HCV RNA Quant and Genotype    6. Need for vaccination  - Pneumococcal vaccine 23 valent PPSV23  (Pneumovax) [53712]  - ADMIN: Vaccine, Initial (53055)    7. Screening for prostate cancer  - PSA, screen    8. Personal history of tobacco use  Qualifies for Lung ca screening   - Prof fee: Shared Decisionmaking for Lung Cancer Screening  - CT Chest Lung Cancer Scrn Low Dose wo; Future  - Okay for Smoking Cessation Study (PLUTO) to Contact Patient    9. Skin lesion  Either from self picking or possible skin Ca  - DERMATOLOGY REFERRAL    10. Schizoaffective disorder, bipolar type (H)  Per psych      COUNSELING:   Reviewed preventive health counseling, as reflected in patient instructions    Estimated body mass index is 24.88 kg/m  as calculated from the following:    Height as of this encounter: 1.759 m (5' 9.25\").    Weight as of this encounter: 77 kg (169 lb 11.2 oz).          reports that he has been smoking cigarettes.  He has a 15.00 pack-year smoking history. He has never used smokeless tobacco.  Tobacco Cessation Action Plan: Information offered: Patient not interested at this time    Counseling Resources:  ATP IV Guidelines  Pooled Cohorts Equation Calculator  FRAX Risk Assessment  ICSI Preventive Guidelines  Dietary Guidelines for Americans, " 2010  USDA's MyPlate  ASA Prophylaxis  Lung CA Screening    Cory Gleason MD  [facility]

## 2019-06-11 NOTE — PROGRESS NOTES
Lung Cancer Screening Shared Decision Making Visit     Sandro Arndt is eligible for lung cancer screening on the basis of the information provided in my signed lung cancer screening order.     I have discussed with patient the risks and benefits of screening for lung cancer with low-dose CT.     The risks include:  radiation exposure: one low dose chest CT has as much ionizing radiation as about 15 chest x-rays or 6 months of background radiation living in [place]    false positives: 96% of positive findings/nodules are NOT cancer, but some might still require additional diagnostic evaluation, including biopsy  over-diagnosis: some slow growing cancers that might never have been clinically significant will be detected and treated unnecessarily     The benefit of early detection of lung cancer is contingent upon adherence to annual screening or more frequent follow up if indicated.     Furthermore, reaping the benefits of screening requires Sandro Arndt to be willing and physically able to undergo diagnostic procedures, if indicated. Although no specific guide is available for determining severity of comorbidities, it is reasonable to withhold screening in patients who have greater mortality risk from other diseases.     We did discuss that the only way to prevent lung cancer is to not smoke. Smoking cessation assistance was offered.    I did not offer risk estimation using a calculator such as this one:    ShouldIScreen

## 2019-06-18 ENCOUNTER — HOSPITAL ENCOUNTER (OUTPATIENT)
Facility: CLINIC | Age: 59
Discharge: NURSING, REHAB OR LONG-TERM CARE | End: 2019-06-18
Attending: SPECIALIST | Admitting: SPECIALIST
Payer: COMMERCIAL

## 2019-06-18 VITALS
SYSTOLIC BLOOD PRESSURE: 135 MMHG | DIASTOLIC BLOOD PRESSURE: 79 MMHG | HEART RATE: 84 BPM | OXYGEN SATURATION: 96 % | RESPIRATION RATE: 10 BRPM

## 2019-06-18 LAB — COLONOSCOPY: NORMAL

## 2019-06-18 PROCEDURE — 40000104 ZZH STATISTIC MODERATE SEDATION < 10 MIN: Performed by: SPECIALIST

## 2019-06-18 PROCEDURE — 25000128 H RX IP 250 OP 636: Performed by: SPECIALIST

## 2019-06-18 PROCEDURE — 45378 DIAGNOSTIC COLONOSCOPY: CPT | Performed by: SPECIALIST

## 2019-06-18 RX ORDER — ONDANSETRON 2 MG/ML
4 INJECTION INTRAMUSCULAR; INTRAVENOUS
Status: DISCONTINUED | OUTPATIENT
Start: 2019-06-18 | End: 2019-06-18 | Stop reason: HOSPADM

## 2019-06-18 RX ORDER — FENTANYL CITRATE 50 UG/ML
INJECTION, SOLUTION INTRAMUSCULAR; INTRAVENOUS PRN
Status: DISCONTINUED | OUTPATIENT
Start: 2019-06-18 | End: 2019-06-18 | Stop reason: HOSPADM

## 2019-06-18 RX ORDER — LIDOCAINE 40 MG/G
CREAM TOPICAL
Status: DISCONTINUED | OUTPATIENT
Start: 2019-06-18 | End: 2019-06-18 | Stop reason: HOSPADM

## 2019-06-18 NOTE — H&P
Pre-Endoscopy History and Physical     Sandro Arndt MRN# [id number]   YOB: 1960 Age: 58 year old     Date of Procedure: 6/18/2019  Primary care provider: Lalo Sosa  Type of Endoscopy: Colonoscopy with possible biopsy, possible polypectomy  Reason for Procedure: screening  Type of Anesthesia Anticipated: Conscious Sedation    HPI:    Sandro is a 58 year old male who will be undergoing the above procedure.      A history and physical has been performed. The patient's medications and allergies have been reviewed. The risks and benefits of the procedure and the sedation options and risks were discussed with the patient.  All questions were answered and informed consent was obtained.      He denies a personal or family history of anesthesia complications or bleeding disorders.     Patient Active Problem List   Diagnosis     Schizoaffective disorder, bipolar type (H)     Tobacco abuse     Borderline personality disorder (H)     COPD mixed type (H)     GERD (gastroesophageal reflux disease)     Alcohol abuse, in remission        Past Medical History:   Diagnosis Date     Borderline high cholesterol      Chronic neck pain     s/p traumatic fracture     COPD (chronic obstructive pulmonary disease) (H)      OD (overdose of drug) 6/21/2012    Overview:  With Ambien - Wanted myself to be heard     Polysubstance abuse (H)     CD Rx x 2     Pulmonary function studies abnormal     abn. DCLO     Schizoaffective disorder, bipolar type (H)      Suicidal ideation 1/23/2011     Tobacco abuse         Past Surgical History:   Procedure Laterality Date     C OPEN RX MANDIBLE FX       GENITOURINARY SURGERY         Social History     Tobacco Use     Smoking status: Current Every Day Smoker     Packs/day: 0.50     Years: 30.00     Pack years: 15.00     Types: Cigarettes     Smokeless tobacco: Never Used   Substance Use Topics     Alcohol use: No       Family History   Problem Relation Age of Onset      Psychotic Disorder Mother      Depression Mother      Skin Cancer Mother      Prostate Cancer Maternal Grandfather        Prior to Admission medications    Medication Sig Start Date End Date Taking? Authorizing Provider   albuterol (PROAIR HFA/PROVENTIL HFA/VENTOLIN HFA) 108 (90 Base) MCG/ACT Inhaler Inhale 1-2 puffs into the lungs every 6 hours as needed for shortness of breath / dyspnea or wheezing 6/8/18  Yes Cory Gleason MD   amitriptyline (ELAVIL) 100 MG tablet Take 100 mg by mouth 4/24/18  Yes Reported, Patient   ARIPiprazole (ABILIFY) 10 MG tablet Take 10 mg by mouth 4/24/18  Yes Reported, Patient   LAMICTAL 100 MG PO TABS 1 TABLET DAILY 7/29/10  Yes Reported, Patient   LamoTRIgine (LAMICTAL PO) Take 200 mg by mouth At Bedtime   Yes Reported, Patient   omeprazole (PRILOSEC) 20 MG DR capsule TAKE 1 CAPSULE BY MOUTH EVERY MORNING BEFORE A MEAL 5/29/19  Yes Cory Gleason MD   omeprazole 20 MG tablet Take 1 tablet (20 mg) by mouth daily 6/8/18  Yes Cory Gleason MD   tiotropium (SPIRIVA HANDIHALER) 18 MCG capsule Inhale contents of one capsule daily. 6/11/18  Yes Cory Gleason MD   TRAZODONE HCL PO Take 100 mg by mouth At Bedtime   Yes Reported, Patient   triamcinolone (KENALOG) 0.1 % cream Apply  topically to affected area(s) 2 times daily. 2/14/18  Yes Reported, Patient       Allergies   Allergen Reactions     Aminoglycosides Swelling     Codeine Hives     Ibuprofen GI Disturbance     Reported by group Starke- sick to his stomach         Neosporin Rash     Amoxicillin Nausea and Vomiting, Swelling and Rash     Per patient body swells up       Codeine Nausea and Vomiting and Nausea     Neomycin-Bacitracin-Polymyxin Hives     Neosporin        REVIEW OF SYSTEMS:   5 point ROS negative except as noted above in HPI, including Gen., Resp., CV, GI &  system review.    PHYSICAL EXAM:   /80   Resp 16   SpO2 95%  Estimated body mass index is 24.88 kg/m  as calculated from the following:    " Height as of 6/11/19: 1.759 m (5' 9.25\").    Weight as of 6/11/19: 77 kg (169 lb 11.2 oz).   GENERAL APPEARANCE: alert, and oriented  MENTAL STATUS: alert  AIRWAY EXAM: Mallampatti Class II (visualization of the soft palate, fauces, and uvula)  RESP: lungs clear to auscultation - no rales, rhonchi or wheezes; smoker's lungs  CV: regular rates and rhythm  DIAGNOSTICS:    Not indicated    IMPRESSION   ASA Class 2 - Mild systemic disease    PLAN:   Plan for Colonoscopy with possible biopsy, possible polypectomy. We discussed the risks, benefits and alternatives and the patient wished to proceed.    The above has been forwarded to the consulting provider.      Signed Electronically by: Clarence Fuentes  June 18, 2019          "

## 2019-06-19 DIAGNOSIS — K21.9 GASTROESOPHAGEAL REFLUX DISEASE WITHOUT ESOPHAGITIS: ICD-10-CM

## 2019-06-19 NOTE — TELEPHONE ENCOUNTER
"Requested Prescriptions   Pending Prescriptions Disp Refills     omeprazole (PRILOSEC) 20 MG DR capsule [Pharmacy Med Name: OMEPRAZOLE 20MG CAP] 28 capsule 0     Sig: TAKE 1 CAPSULE BY MOUTH EVERY MORNING BEFORE A MEAL       PPI Protocol Passed - 6/19/2019  3:33 PM        Passed - Not on Clopidogrel (unless Pantoprazole ordered)        Passed - No diagnosis of osteoporosis on record        Passed - Recent (12 mo) or future (30 days) visit within the authorizing provider's specialty     Patient had office visit in the last 12 months or has a visit in the next 30 days with authorizing provider or within the authorizing provider's specialty.  See \"Patient Info\" tab in inbasket, or \"Choose Columns\" in Meds & Orders section of the refill encounter.              Passed - Medication is active on med list        Passed - Patient is age 18 or older          "

## 2019-06-26 DIAGNOSIS — J43.9 PULMONARY EMPHYSEMA, UNSPECIFIED EMPHYSEMA TYPE (H): ICD-10-CM

## 2019-06-26 RX ORDER — TIOTROPIUM BROMIDE 18 UG/1
CAPSULE ORAL; RESPIRATORY (INHALATION)
Qty: 30 CAPSULE | Refills: 11 | Status: SHIPPED | OUTPATIENT
Start: 2019-06-26 | End: 2020-05-20

## 2019-06-26 NOTE — TELEPHONE ENCOUNTER
"Requested Prescriptions   Pending Prescriptions Disp Refills     tiotropium (SPIRIVA HANDIHALER) 18 MCG inhaled capsule [Pharmacy Med Name: SPIRIVA HANDIHLR CAP] 30 capsule 11     Sig: INHALE CONTENTS OF ONE CAPSULE DAILY USING HANDIHALER DEVICE       Asthma Maintenance Inhalers - Anticholinergics Passed - 6/26/2019 11:42 AM        Passed - Patient is age 12 years or older        Passed - Recent (12 mo) or future (30 days) visit within the authorizing provider's specialty     Patient had office visit in the last 12 months or has a visit in the next 30 days with authorizing provider or within the authorizing provider's specialty.  See \"Patient Info\" tab in inbasket, or \"Choose Columns\" in Meds & Orders section of the refill encounter.              Passed - Medication is active on med list          Prescription approved per [facility] [place].    Fabiola STEPHENS RN, BSN, PHN      "

## 2019-06-27 ENCOUNTER — HOSPITAL ENCOUNTER (OUTPATIENT)
Facility: CLINIC | Age: 59
Discharge: HOME | End: 2019-06-27
Attending: SPECIALIST | Admitting: SPECIALIST
Payer: COMMERCIAL

## 2019-06-27 VITALS
HEIGHT: 70 IN | DIASTOLIC BLOOD PRESSURE: 67 MMHG | HEART RATE: 92 BPM | RESPIRATION RATE: 25 BRPM | WEIGHT: 170 LBS | SYSTOLIC BLOOD PRESSURE: 90 MMHG | OXYGEN SATURATION: 96 % | BODY MASS INDEX: 24.34 KG/M2

## 2019-06-27 LAB — COLONOSCOPY: NORMAL

## 2019-06-27 PROCEDURE — 25000128 H RX IP 250 OP 636: Performed by: SPECIALIST

## 2019-06-27 PROCEDURE — G0121 COLON CA SCRN NOT HI RSK IND: HCPCS | Performed by: SPECIALIST

## 2019-06-27 PROCEDURE — 45378 DIAGNOSTIC COLONOSCOPY: CPT | Performed by: SPECIALIST

## 2019-06-27 PROCEDURE — G0500 MOD SEDAT ENDO SERVICE >5YRS: HCPCS | Performed by: SPECIALIST

## 2019-06-27 RX ORDER — FENTANYL CITRATE 50 UG/ML
INJECTION, SOLUTION INTRAMUSCULAR; INTRAVENOUS PRN
Status: DISCONTINUED | OUTPATIENT
Start: 2019-06-27 | End: 2019-06-27 | Stop reason: HOSPADM

## 2019-06-27 RX ORDER — ONDANSETRON 2 MG/ML
4 INJECTION INTRAMUSCULAR; INTRAVENOUS
Status: DISCONTINUED | OUTPATIENT
Start: 2019-06-27 | End: 2019-06-27 | Stop reason: HOSPADM

## 2019-06-27 RX ORDER — LIDOCAINE 40 MG/G
CREAM TOPICAL
Status: DISCONTINUED | OUTPATIENT
Start: 2019-06-27 | End: 2019-06-27 | Stop reason: HOSPADM

## 2019-06-27 ASSESSMENT — MIFFLIN-ST. JEOR: SCORE: 1607.36

## 2019-07-11 ENCOUNTER — OFFICE VISIT (OUTPATIENT)
Dept: INTERNAL MEDICINE | Facility: CLINIC | Age: 59
End: 2019-07-11
Payer: COMMERCIAL

## 2019-07-11 VITALS
BODY MASS INDEX: 24.22 KG/M2 | RESPIRATION RATE: 16 BRPM | DIASTOLIC BLOOD PRESSURE: 74 MMHG | SYSTOLIC BLOOD PRESSURE: 114 MMHG | TEMPERATURE: 98.5 F | WEIGHT: 168.8 LBS | OXYGEN SATURATION: 96 % | HEART RATE: 95 BPM

## 2019-07-11 DIAGNOSIS — E78.2 MIXED HYPERLIPIDEMIA: Primary | ICD-10-CM

## 2019-07-11 DIAGNOSIS — Z12.11 COLON CANCER SCREENING: ICD-10-CM

## 2019-07-11 LAB
ALT SERPL W P-5'-P-CCNC: 24 U/L (ref 0–70)
AST SERPL W P-5'-P-CCNC: 12 U/L (ref 0–45)

## 2019-07-11 PROCEDURE — 99214 OFFICE O/P EST MOD 30 MIN: CPT | Performed by: INTERNAL MEDICINE

## 2019-07-11 PROCEDURE — 84460 ALANINE AMINO (ALT) (SGPT): CPT | Performed by: INTERNAL MEDICINE

## 2019-07-11 PROCEDURE — 36415 COLL VENOUS BLD VENIPUNCTURE: CPT | Performed by: INTERNAL MEDICINE

## 2019-07-11 PROCEDURE — 84450 TRANSFERASE (AST) (SGOT): CPT | Performed by: INTERNAL MEDICINE

## 2019-07-11 RX ORDER — PRAVASTATIN SODIUM 40 MG
40 TABLET ORAL AT BEDTIME
Qty: 90 TABLET | Refills: 3 | Status: SHIPPED | OUTPATIENT
Start: 2019-07-11 | End: 2020-05-20

## 2019-07-11 NOTE — PROGRESS NOTES
Subjective     Sandro Arndt is a 58 year old male who presents to clinic today for the following health issues:    HPI     Pt is here for a follow up from colonoscopy 6/27  Patient was unable to comply with his prep on 2 separate occasions.  He states that he got up late at night after doing the prep and ate.       Hyperlipidemia Follow-Up  Lab Results   Component Value Date    HDL 55 06/11/2019     06/11/2019    CHOL 230 06/11/2019    TRIG 164 06/11/2019          Are you having any of the following symptoms? (Select all that apply)  No complaints of shortness of breath, chest pain or pressure.  No increased sweating or nausea with activity.  No left-sided neck or arm pain.  No complaints of pain in calves when walking 1-2 blocks.    Are you regularly taking any medication or supplement to lower your cholesterol?   No    Are you having muscle aches or other side effects that you think could be caused by your cholesterol lowering medication?  No               Reviewed and updated as needed this visit by Provider         Review of Systems   ROS COMP: Constitutional, HEENT, cardiovascular, pulmonary, gi and gu systems are negative, except as otherwise noted.      Objective    /74   Pulse 95   Temp 98.5  F (36.9  C) (Temporal)   Resp 16   Wt 76.6 kg (168 lb 12.8 oz)   SpO2 96%   BMI 24.22 kg/m    Body mass index is 24.22 kg/m .  Physical Exam   GENERAL APPEARANCE: alert and no distress    Labs reviewed in Epic        Assessment & Plan     1. Mixed hyperlipidemia  Stat statin due to CV risk 13%  - pravastatin (PRAVACHOL) 40 MG tablet; Take 1 tablet (40 mg) by mouth At Bedtime  Dispense: 90 tablet; Refill: 3  - AST  - ALT  - ALT; Future  - Lipid panel reflex to direct LDL Fasting; Future    2. Colon cancer screening  Next year will do FIT testing rather than colonoscopy       Tobacco Cessation:   reports that he has been smoking cigarettes.  He has a 15.00 pack-year smoking history. He has never used  smokeless tobacco.  Tobacco Cessation Action Plan: Information offered: Patient not interested at this time            Return in about 6 months (around 1/11/2020) for Lab Work-lipids.    Cory Gleason MD  [facility]

## 2019-07-22 ENCOUNTER — OFFICE VISIT (OUTPATIENT)
Dept: DERMATOLOGY | Facility: CLINIC | Age: 59
End: 2019-07-22
Payer: COMMERCIAL

## 2019-07-22 VITALS — DIASTOLIC BLOOD PRESSURE: 79 MMHG | OXYGEN SATURATION: 98 % | HEART RATE: 100 BPM | SYSTOLIC BLOOD PRESSURE: 114 MMHG

## 2019-07-22 DIAGNOSIS — Z11.59 NEED FOR HEPATITIS C SCREENING TEST: ICD-10-CM

## 2019-07-22 DIAGNOSIS — D48.5 NEOPLASM OF UNCERTAIN BEHAVIOR OF SKIN: Primary | ICD-10-CM

## 2019-07-22 DIAGNOSIS — Z13.6 CARDIOVASCULAR SCREENING; LDL GOAL LESS THAN 160: ICD-10-CM

## 2019-07-22 DIAGNOSIS — D22.9 MULTIPLE BENIGN NEVI: ICD-10-CM

## 2019-07-22 DIAGNOSIS — D18.01 CHERRY ANGIOMA: ICD-10-CM

## 2019-07-22 DIAGNOSIS — Z13.220 LIPID SCREENING: ICD-10-CM

## 2019-07-22 DIAGNOSIS — L84 CALLUS: ICD-10-CM

## 2019-07-22 LAB
ANION GAP SERPL CALCULATED.3IONS-SCNC: 4 MMOL/L (ref 3–14)
BUN SERPL-MCNC: 9 MG/DL (ref 7–30)
CALCIUM SERPL-MCNC: 9.1 MG/DL (ref 8.5–10.1)
CHLORIDE SERPL-SCNC: 104 MMOL/L (ref 94–109)
CHOLEST SERPL-MCNC: 153 MG/DL
CO2 SERPL-SCNC: 33 MMOL/L (ref 20–32)
CREAT SERPL-MCNC: 1.14 MG/DL (ref 0.66–1.25)
GFR SERPL CREATININE-BSD FRML MDRD: 71 ML/MIN/{1.73_M2}
GLUCOSE SERPL-MCNC: 75 MG/DL (ref 70–99)
HDLC SERPL-MCNC: 51 MG/DL
LDLC SERPL CALC-MCNC: 55 MG/DL
NONHDLC SERPL-MCNC: 102 MG/DL
POTASSIUM SERPL-SCNC: 3.9 MMOL/L (ref 3.4–5.3)
SODIUM SERPL-SCNC: 141 MMOL/L (ref 133–144)
TRIGL SERPL-MCNC: 236 MG/DL

## 2019-07-22 PROCEDURE — 99243 OFF/OP CNSLTJ NEW/EST LOW 30: CPT | Mod: 25 | Performed by: PHYSICIAN ASSISTANT

## 2019-07-22 PROCEDURE — 80048 BASIC METABOLIC PNL TOTAL CA: CPT | Performed by: INTERNAL MEDICINE

## 2019-07-22 PROCEDURE — 88305 TISSUE EXAM BY PATHOLOGIST: CPT | Mod: TC | Performed by: PHYSICIAN ASSISTANT

## 2019-07-22 PROCEDURE — 11102 TANGNTL BX SKIN SINGLE LES: CPT | Performed by: PHYSICIAN ASSISTANT

## 2019-07-22 PROCEDURE — 86803 HEPATITIS C AB TEST: CPT | Performed by: INTERNAL MEDICINE

## 2019-07-22 PROCEDURE — 36415 COLL VENOUS BLD VENIPUNCTURE: CPT | Performed by: INTERNAL MEDICINE

## 2019-07-22 PROCEDURE — 80061 LIPID PANEL: CPT | Performed by: INTERNAL MEDICINE

## 2019-07-22 NOTE — LETTER
7/22/2019         RE: Sandro Arndt  [address] [address]        Dear Colleague,    Thank you for referring your patient, Sandro Arndt, to the [facility]. Please see a copy of my visit note below.    HPI:  I was asked to see pt by Dr. Sosa. Sandro Arndt is a 58 year old male patient here today for growth on right shoulder .  Patient states this has been present for a while.  Patient reports the following symptoms: none .  Patient reports the following previous treatments: none.  Patient reports the following modifying factors: none.  Associated symptoms: none.  Patient has no other skin complaints today.  Remainder of the HPI, Meds, PMH, Allergies, FH, and SH was reviewed in chart.    Pertinent Hx:   No personal history of skin cancer. Mother had skin cancer.     Past Medical History:   Diagnosis Date     Borderline high cholesterol      Chronic neck pain     s/p traumatic fracture     COPD (chronic obstructive pulmonary disease) (H)      OD (overdose of drug) 6/21/2012    Overview:  With Ambien - Wanted myself to be heard     Polysubstance abuse (H)     CD Rx x 2     Pulmonary function studies abnormal     abn. DCLO     Schizoaffective disorder, bipolar type (H)      Suicidal ideation 1/23/2011     Tobacco abuse        Past Surgical History:   Procedure Laterality Date     C OPEN RX MANDIBLE FX       COLONOSCOPY N/A 6/18/2019    Procedure: COLONOSCOPY;  Surgeon: Clarence Fuentes MD;  Location:  GI     COLONOSCOPY N/A 6/27/2019    Procedure: COLONOSCOPY;  Surgeon: Clarence Fuentes MD;  Location:  GI     GENITOURINARY SURGERY          Family History   Problem Relation Age of Onset     Psychotic Disorder Mother      Depression Mother      Skin Cancer Mother      Prostate Cancer Maternal Grandfather        Social History     Socioeconomic History     Marital status: Single     Spouse name: Not on file     Number of children: Not on file     Years of education:  Not on file     Highest education level: Not on file   Occupational History     Not on file   Social Needs     Financial resource strain: Not on file     Food insecurity:     Worry: Not on file     Inability: Not on file     Transportation needs:     Medical: Not on file     Non-medical: Not on file   Tobacco Use     Smoking status: Current Every Day Smoker     Packs/day: 0.50     Years: 30.00     Pack years: 15.00     Types: Cigarettes     Smokeless tobacco: Never Used   Substance and Sexual Activity     Alcohol use: No     Drug use: No     Sexual activity: Yes     Partners: Female   Lifestyle     Physical activity:     Days per week: Not on file     Minutes per session: Not on file     Stress: Not on file   Relationships     Social connections:     Talks on phone: Not on file     Gets together: Not on file     Attends Spiritism service: Not on file     Active member of club or organization: Not on file     Attends meetings of clubs or organizations: Not on file     Relationship status: Not on file     Intimate partner violence:     Fear of current or ex partner: Not on file     Emotionally abused: Not on file     Physically abused: Not on file     Forced sexual activity: Not on file   Other Topics Concern     Parent/sibling w/ CABG, MI or angioplasty before 65F 55M? Not Asked   Social History Narrative     Not on file       Outpatient Encounter Medications as of 7/22/2019   Medication Sig Dispense Refill     albuterol (PROAIR HFA/PROVENTIL HFA/VENTOLIN HFA) 108 (90 Base) MCG/ACT Inhaler Inhale 1-2 puffs into the lungs every 6 hours as needed for shortness of breath / dyspnea or wheezing 1 Inhaler 11     amitriptyline (ELAVIL) 100 MG tablet Take 100 mg by mouth       ARIPiprazole (ABILIFY) 10 MG tablet Take 10 mg by mouth       LAMICTAL 100 MG PO TABS 1 TABLET DAILY       LamoTRIgine (LAMICTAL PO) Take 200 mg by mouth At Bedtime       omeprazole (PRILOSEC) 20 MG DR capsule TAKE 1 CAPSULE BY MOUTH EVERY MORNING  BEFORE A MEAL 28 capsule 11     pravastatin (PRAVACHOL) 40 MG tablet Take 1 tablet (40 mg) by mouth At Bedtime 90 tablet 3     tiotropium (SPIRIVA HANDIHALER) 18 MCG inhaled capsule INHALE CONTENTS OF ONE CAPSULE DAILY USING HANDIHALER DEVICE 30 capsule 11     TRAZODONE HCL PO Take 100 mg by mouth At Bedtime       triamcinolone (KENALOG) 0.1 % cream Apply  topically to affected area(s) 2 times daily.       No facility-administered encounter medications on file as of 7/22/2019.        Review Of Systems:  Skin: As above  Eyes: negative  Ears/Nose/Throat: negative  Respiratory: No shortness of breath, dyspnea on exertion, cough, or hemoptysis  Cardiovascular: negative  Gastrointestinal: negative  Genitourinary: negative  Musculoskeletal: negative  Neurologic: negative  Psychiatric: negative  Hematologic/Lymphatic/Immunologic: negative  Endocrine: negative      Objective:     /79   Pulse 100   SpO2 98%   Eyes: Conjunctivae/lids: Normal   ENT: Lips:  Normal  MSK: Normal  Cardiovascular: Peripheral edema none  Pulm: Breathing Normal  Neuro/Psych: Orientation: Normal; Mood/Affect: Normal, NAD, WDWN  Pt accompanied by: self  Following areas examined: face, back, right dorsal foot.  Pt defers full body exam.   Orosco skin type:ii   Findings:  Red smooth well-defined macules on back  Well circumscribed macules with symmetric color distribution on back  Light brown macule on right dorsal foot  Assessment and Plan:  1) Neoplasm of uncertain behavior right posterior shoulder 0.7cm  Pigmented bcc vs MM  TANGENTIAL BIOPSY:  After consent, anesthesia with LEC and prep, tangential biopsy performed.  No complications and routine wound care.  May grow back and will get a scar. Based on lesion type may need to completely remove lesion. Patient will be notified in 7-10 days of results. Wound care directions given.    2)cherry angioma and benign nevi  Benign etiology and course of lesion.    Treatment of these lesions would  be purely cosmetic and not medically neccessary.  Lesion may recur and/or may not completely resolve. May need additional treatment.  Different removal options including excision, cryotherapy, cautery and /or laser.      3)  callus vs isk  Shoes may be rubbing on foot.   Watch and monitor  Consider ln2 if desired. Pt defers.   Signs and Symptoms of non-melanoma skin cancer and ABCDEs of melanoma reviewed with patient. Patient encouraged to perform monthly self skin exams and educated on how to perform them. UV precautions reviewed with patient. Patient was asked about new or changing moles/lesions on body.   Wear a sunscreen with at least SPF 30 on your face, ears, neck and V of the chest daily. Wear sunscreen on other areas of the body if those areas are exposed to the sun throughout the day. Sunscreens can contain physical and/or chemical blockers. Physical blockers are less likely to clog pores, these include zinc oxide and titanium dioxide. Reapply every two hour and after swimming. Sunscreen examples include Neutrogena, CeraVe, Blue Lizard, Elta MD and many others.      Follow up in yearly FBE      Again, thank you for allowing me to participate in the care of your patient.        Sincerely,        Tierney Iqbal PA-C

## 2019-07-22 NOTE — PATIENT INSTRUCTIONS
Wound Care Instructions     FOR SUPERFICIAL WOUNDS     [facility] [phone]    [facility] [phone]          AFTER 24 HOURS YOU SHOULD REMOVE THE BANDAGE AND BEGIN DAILY DRESSING CHANGES AS FOLLOWS:     1) Remove Dressing.     2) Clean and dry the area with tap water using a Q-tip or sterile gauze pad.     3) Apply Vaseline, Aquaphor, Polysporin ointment or Bacitracin ointment over entire wound.  Do NOT use Neosporin ointment.     4) Cover the wound with a band-aid, or a sterile non-stick gauze pad and micropore paper tape      REPEAT THESE INSTRUCTIONS AT LEAST ONCE A DAY UNTIL THE WOUND HAS COMPLETELY HEALED.    It is an old wives tale that a wound heals better when it is exposed to air and allowed to dry out. The wound will heal faster with a better cosmetic result if it is kept moist with ointment and covered with a bandage.    **Do not let the wound dry out.**      Supplies Needed:      *Cotton tipped applicators (Q-tips)    *Polysporin Ointment or Bacitracin Ointment (NOT NEOSPORIN)    *Band-aids or non-stick gauze pads and micropore paper tape.      PATIENT INFORMATION:    During the healing process you will notice a number of changes. All wounds develop a small halo of redness surrounding the wound.  This means healing is occurring. Severe itching with extensive redness usually indicates sensitivity to the ointment or bandage tape used to dress the wound.  You should call our office if this develops.      Swelling  and/or discoloration around your surgical site is common, particularly when performed around the eye.    All wounds normally drain.  The larger the wound the more drainage there will be.  After 7-10 days, you will notice the wound beginning to shrink and new skin will begin to grow.  The wound is healed when you can see skin has formed over the entire area.  A healed wound has a healthy, shiny look to the surface and is red to dark pink in color to  normalize.  Wounds may take approximately 4-6 weeks to heal.  Larger wounds may take 6-8 weeks.  After the wound is healed you may discontinue dressing changes.    You may experience a sensation of tightness as your wound heals. This is normal and will gradually subside.    Your healed wound may be sensitive to temperature changes. This sensitivity improves with time, but if you re having a lot of discomfort, try to avoid temperature extremes.    Patients frequently experience itching after their wound appears to have healed because of the continue healing under the skin.  Plain Vaseline will help relieve the itching.        POSSIBLE COMPLICATIONS    BLEEDIN. Leave the bandage in place.  2. Use tightly rolled up gauze or a cloth to apply direct pressure over the bandage for 30  minutes.  3. Reapply pressure for an additional 30 minutes if necessary  4. Use additional gauze and tape to maintain pressure once the bleeding has stopped.    Proper skin care from [facility]:    -Eliminate harsh soaps as they strip the natural oils from the skin, often resulting in dry itchy skin ( i.e. Dial, Zest, [place] Spring)  -Use mild soaps such as Cetaphil or Dove Sensitive Skin in the shower. You do not need to use soap on arms, legs, and trunk every time you shower unless visibly soiled.   -Avoid hot or cold showers.  -After showering, lightly dry off and apply moisturizing within 2-3 minutes. This will help trap moisture in the skin.   -Aggressive use of a moisturizer at least 1-2 times a day to the entire body (including -Vanicream, Cetaphil, Aquaphor or Cerave) and moisturize hands after every washing.  -We recommend using moisturizers that come in a tub that needs to be scooped out, not a pump. This has more of an oil base. It will hold moisture in your skin much better than a water base moisturizer. The above recommended are non-pore clogging.      Wear a sunscreen with at least SPF 30 on your face, ears, neck  and V of the chest daily. Wear sunscreen on other areas of the body if those areas are exposed to the sun throughout the day. Sunscreens can contain physical and/or chemical blockers. Physical blockers are less likely to clog pores, these include zinc oxide and titanium dioxide. Reapply every two hour and after swimming. Sunscreen examples include Neutrogena, CeraVe, Blue Lizard, Elta MD and many others.    UV radiation  UVA radiation remains constant throughout the day and throughout the year. It is a longer wavelength than UVB and therefore penetrates deeper into the skin leading to immediate and delayed tanning, photoaging, and skin cancer. 70-80% of UVA and UVB radiation occurs between the hours of 10am-2pm.  UVB radiation  UVB radiation causes the most harmful effects and is more significant during the summer months. However, snow and ice can reflect UVB radiation leading to skin damage during the winter months as well. UVB radiation is responsible for tanning, burning, inflammation, delayed erythema (pinkness), pigmentation (brown spots), and skin cancer.

## 2019-07-22 NOTE — PROGRESS NOTES
HPI:  I was asked to see pt by Dr. Sosa. Sandro Arndt is a 58 year old male patient here today for growth on right shoulder .  Patient states this has been present for a while.  Patient reports the following symptoms: none .  Patient reports the following previous treatments: none.  Patient reports the following modifying factors: none.  Associated symptoms: none.  Patient has no other skin complaints today.  Remainder of the HPI, Meds, PMH, Allergies, FH, and SH was reviewed in chart.    Pertinent Hx:   No personal history of skin cancer. Mother had skin cancer.     Past Medical History:   Diagnosis Date     Borderline high cholesterol      Chronic neck pain     s/p traumatic fracture     COPD (chronic obstructive pulmonary disease) (H)      OD (overdose of drug) 6/21/2012    Overview:  With Ambien - Wanted myself to be heard     Polysubstance abuse (H)     CD Rx x 2     Pulmonary function studies abnormal     abn. DCLO     Schizoaffective disorder, bipolar type (H)      Suicidal ideation 1/23/2011     Tobacco abuse        Past Surgical History:   Procedure Laterality Date     C OPEN RX MANDIBLE FX       COLONOSCOPY N/A 6/18/2019    Procedure: COLONOSCOPY;  Surgeon: Clarence Fuentes MD;  Location:  GI     COLONOSCOPY N/A 6/27/2019    Procedure: COLONOSCOPY;  Surgeon: Clarence Fuentes MD;  Location:  GI     GENITOURINARY SURGERY          Family History   Problem Relation Age of Onset     Psychotic Disorder Mother      Depression Mother      Skin Cancer Mother      Prostate Cancer Maternal Grandfather        Social History     Socioeconomic History     Marital status: Single     Spouse name: Not on file     Number of children: Not on file     Years of education: Not on file     Highest education level: Not on file   Occupational History     Not on file   Social Needs     Financial resource strain: Not on file     Food insecurity:     Worry: Not on file     Inability: Not on file     Transportation needs:      Medical: Not on file     Non-medical: Not on file   Tobacco Use     Smoking status: Current Every Day Smoker     Packs/day: 0.50     Years: 30.00     Pack years: 15.00     Types: Cigarettes     Smokeless tobacco: Never Used   Substance and Sexual Activity     Alcohol use: No     Drug use: No     Sexual activity: Yes     Partners: Female   Lifestyle     Physical activity:     Days per week: Not on file     Minutes per session: Not on file     Stress: Not on file   Relationships     Social connections:     Talks on phone: Not on file     Gets together: Not on file     Attends Jewish service: Not on file     Active member of club or organization: Not on file     Attends meetings of clubs or organizations: Not on file     Relationship status: Not on file     Intimate partner violence:     Fear of current or ex partner: Not on file     Emotionally abused: Not on file     Physically abused: Not on file     Forced sexual activity: Not on file   Other Topics Concern     Parent/sibling w/ CABG, MI or angioplasty before 65F 55M? Not Asked   Social History Narrative     Not on file       Outpatient Encounter Medications as of 7/22/2019   Medication Sig Dispense Refill     albuterol (PROAIR HFA/PROVENTIL HFA/VENTOLIN HFA) 108 (90 Base) MCG/ACT Inhaler Inhale 1-2 puffs into the lungs every 6 hours as needed for shortness of breath / dyspnea or wheezing 1 Inhaler 11     amitriptyline (ELAVIL) 100 MG tablet Take 100 mg by mouth       ARIPiprazole (ABILIFY) 10 MG tablet Take 10 mg by mouth       LAMICTAL 100 MG PO TABS 1 TABLET DAILY       LamoTRIgine (LAMICTAL PO) Take 200 mg by mouth At Bedtime       omeprazole (PRILOSEC) 20 MG DR capsule TAKE 1 CAPSULE BY MOUTH EVERY MORNING BEFORE A MEAL 28 capsule 11     pravastatin (PRAVACHOL) 40 MG tablet Take 1 tablet (40 mg) by mouth At Bedtime 90 tablet 3     tiotropium (SPIRIVA HANDIHALER) 18 MCG inhaled capsule INHALE CONTENTS OF ONE CAPSULE DAILY USING HANDIHALER DEVICE 30 capsule  11     TRAZODONE HCL PO Take 100 mg by mouth At Bedtime       triamcinolone (KENALOG) 0.1 % cream Apply  topically to affected area(s) 2 times daily.       No facility-administered encounter medications on file as of 7/22/2019.        Review Of Systems:  Skin: As above  Eyes: negative  Ears/Nose/Throat: negative  Respiratory: No shortness of breath, dyspnea on exertion, cough, or hemoptysis  Cardiovascular: negative  Gastrointestinal: negative  Genitourinary: negative  Musculoskeletal: negative  Neurologic: negative  Psychiatric: negative  Hematologic/Lymphatic/Immunologic: negative  Endocrine: negative      Objective:     /79   Pulse 100   SpO2 98%   Eyes: Conjunctivae/lids: Normal   ENT: Lips:  Normal  MSK: Normal  Cardiovascular: Peripheral edema none  Pulm: Breathing Normal  Neuro/Psych: Orientation: Normal; Mood/Affect: Normal, NAD, WDWN  Pt accompanied by: self  Following areas examined: face, back, right dorsal foot.  Pt defers full body exam.   Orosco skin type:ii   Findings:  Red smooth well-defined macules on back  Well circumscribed macules with symmetric color distribution on back  Light brown macule on right dorsal foot  Assessment and Plan:  1) Neoplasm of uncertain behavior right posterior shoulder 0.7cm  Pigmented bcc vs MM  TANGENTIAL BIOPSY:  After consent, anesthesia with LEC and prep, tangential biopsy performed.  No complications and routine wound care.  May grow back and will get a scar. Based on lesion type may need to completely remove lesion. Patient will be notified in 7-10 days of results. Wound care directions given.    2)cherry angioma and benign nevi  Benign etiology and course of lesion.    Treatment of these lesions would be purely cosmetic and not medically neccessary.  Lesion may recur and/or may not completely resolve. May need additional treatment.  Different removal options including excision, cryotherapy, cautery and /or laser.      3)  callus vs isk  Shoes may be  rubbing on foot.   Watch and monitor  Consider ln2 if desired. Pt defers.   Signs and Symptoms of non-melanoma skin cancer and ABCDEs of melanoma reviewed with patient. Patient encouraged to perform monthly self skin exams and educated on how to perform them. UV precautions reviewed with patient. Patient was asked about new or changing moles/lesions on body.   Wear a sunscreen with at least SPF 30 on your face, ears, neck and V of the chest daily. Wear sunscreen on other areas of the body if those areas are exposed to the sun throughout the day. Sunscreens can contain physical and/or chemical blockers. Physical blockers are less likely to clog pores, these include zinc oxide and titanium dioxide. Reapply every two hour and after swimming. Sunscreen examples include Neutrogena, CeraVe, Blue Lizard, Elta MD and many others.      Follow up in yearly FBE

## 2019-07-23 LAB — HCV AB SERPL QL IA: NONREACTIVE

## 2019-07-24 LAB — COPATH REPORT: NORMAL

## 2019-07-25 ENCOUNTER — TELEPHONE (OUTPATIENT)
Dept: DERMATOLOGY | Facility: CLINIC | Age: 59
End: 2019-07-25

## 2019-07-25 NOTE — TELEPHONE ENCOUNTER
----- Message from Aaron Lopez MD sent at 7/25/2019 12:20 PM CDT -----  FINAL DIAGNOSIS:   Skin, right posterior shoulder:   - Basal cell carcinoma, nodular type, extending to the deep margin - (see   description)     Please schedule excision

## 2019-07-25 NOTE — TELEPHONE ENCOUNTER
Called and spoke to patient. Educated patient on biopsy results- BCC. Educated patient on BCC, mohs, scheduled mohs, and letter/packet sent. Patient voiced understanding.    Adrián RN-BSN  [facility]  [phone]

## 2019-07-25 NOTE — LETTER
[facility]  [address]  [address]  [phone]    7/25/2019       Sandro Arndt  [address] [address]      Dear Sandro:    You are scheduled for Mohs Surgery on: 9/26/19 @10:00am.    Please check in at [facility], [address].     You don't need to arrive more than 5-10 minutes prior to your appointment time.     Be sure to eat a good breakfast and bathe and wash your hair prior to surgery.     If you are taking any anti-coagulants that are prescribed by your Doctor (such as Coumadin/Warfarin, Plavix, Aspirin, Ibuprofen), please continue taking them.     However, if you are taking anti-coagulants over the counter without a Doctor's order for a medical condition, please discontinue them 10 days prior to surgery.     Please wear loose comfortable clothing as it could possibly be 4-6 hours until your surgery is completed depending upon how many layers of tissue need to be removed.      Thank you,    YANNA Lopez MD

## 2019-08-05 DIAGNOSIS — J43.9 PULMONARY EMPHYSEMA, UNSPECIFIED EMPHYSEMA TYPE (H): ICD-10-CM

## 2019-08-05 RX ORDER — ALBUTEROL SULFATE 90 UG/1
AEROSOL, METERED RESPIRATORY (INHALATION)
Qty: 18 G | Refills: 11 | Status: SHIPPED | OUTPATIENT
Start: 2019-08-05 | End: 2020-07-27

## 2019-08-05 NOTE — TELEPHONE ENCOUNTER
"Requested Prescriptions   Pending Prescriptions Disp Refills     VENTOLIN  (90 Base) MCG/ACT inhaler [Pharmacy Med Name: VENTOLIN HFA  AER] 18 g      Sig: INHALE 1 TO 2 PUFFS BY MOUTH EVERY SIX HOURS AS NEEDED FOR SHORTNESS OF BREATH       Asthma Maintenance Inhalers - Anticholinergics Passed - 8/5/2019 11:48 AM        Passed - Patient is age 12 years or older        Passed - Recent (12 mo) or future (30 days) visit within the authorizing provider's specialty     Patient had office visit in the last 12 months or has a visit in the next 30 days with authorizing provider or within the authorizing provider's specialty.  See \"Patient Info\" tab in inbasket, or \"Choose Columns\" in Meds & Orders section of the refill encounter.              Passed - Medication is active on med list          Prescription approved per [facility] Refill Protocol.    Fabiola STEPHENS RN, BSN, PHN      "

## 2019-08-13 ENCOUNTER — OFFICE VISIT (OUTPATIENT)
Dept: INTERNAL MEDICINE | Facility: CLINIC | Age: 59
End: 2019-08-13
Payer: COMMERCIAL

## 2019-08-13 VITALS
OXYGEN SATURATION: 95 % | HEART RATE: 94 BPM | SYSTOLIC BLOOD PRESSURE: 110 MMHG | DIASTOLIC BLOOD PRESSURE: 80 MMHG | RESPIRATION RATE: 14 BRPM | WEIGHT: 168 LBS | TEMPERATURE: 98.8 F | BODY MASS INDEX: 24.11 KG/M2

## 2019-08-13 DIAGNOSIS — R07.89 CHEST WALL PAIN: Primary | ICD-10-CM

## 2019-08-13 DIAGNOSIS — R94.31 ABNORMAL ELECTROCARDIOGRAM: ICD-10-CM

## 2019-08-13 DIAGNOSIS — R07.9 CHEST PAIN, UNSPECIFIED TYPE: ICD-10-CM

## 2019-08-13 DIAGNOSIS — J44.9 COPD MIXED TYPE (H): ICD-10-CM

## 2019-08-13 PROCEDURE — 93000 ELECTROCARDIOGRAM COMPLETE: CPT | Performed by: INTERNAL MEDICINE

## 2019-08-13 PROCEDURE — 99215 OFFICE O/P EST HI 40 MIN: CPT | Performed by: INTERNAL MEDICINE

## 2019-08-13 NOTE — PROGRESS NOTES
"Subjective     Sandro Arndt is a 58 year old male who presents to clinic today for the following health issues:    HPI   CHEST PAIN     Onset: Few months     Description:   Location:  Right side of chest  Character: tight and Throbbing   Radiation: none  Duration: intermittent, lasts under few minutes    Intensity: moderate    Progression of Symptoms:  same    Accompanying Signs & Symptoms:  Shortness of breath: no  Sweating: no  Nausea/vomiting: no  Lightheadedness: YES  Palpitations: YES  Fever/Chills: no  Cough: YES  Heartburn: no    History:   Family history of heart disease no  Tobacco use: YES    Precipitating factors:   Worse with exertion: no  Worse with deep breaths :  no  Related to food: no    Alleviating factors:  anne    Happens randomly, no association with any exertion.   No shortness of breath or dyspnea on exertion   Does not limit him in any way.   No fevers, no chills.  Goes away with medication and \"claering my head\"  Lasts under 10  minutes, then goes away.     Happens 2-3 times per week.      history of smoking and COPD,  on inhalers, doing welll, no changes in breahting. .         Therapies Tried and outcome: none              Reviewed and updated as needed this visit by Provider         Review of Systems         Objective    /80   Pulse 94   Temp 98.8  F (37.1  C) (Temporal)   Resp 14   Wt 76.2 kg (168 lb)   SpO2 95%   BMI 24.11 kg/m    Body mass index is 24.11 kg/m .  Physical Exam                 Past Medical History:  ---------------------------  Past Medical History:   Diagnosis Date     Borderline high cholesterol      Chronic neck pain     s/p traumatic fracture     COPD (chronic obstructive pulmonary disease) (H)      OD (overdose of drug) 6/21/2012    Overview:  With Ambien - Wanted myself to be heard     Polysubstance abuse (H)     CD Rx x 2     Pulmonary function studies abnormal     abn. DCLO     Schizoaffective disorder, bipolar type (H)      Suicidal ideation " 1/23/2011     Tobacco abuse        Past Surgical History:  ---------------------------  Past Surgical History:   Procedure Laterality Date     C OPEN RX MANDIBLE FX       COLONOSCOPY N/A 6/18/2019    Procedure: COLONOSCOPY;  Surgeon: Clarence Fuentes MD;  Location:  GI     COLONOSCOPY N/A 6/27/2019    Procedure: COLONOSCOPY;  Surgeon: Clarence Fuentes MD;  Location:  GI     GENITOURINARY SURGERY         Current Medications:  ---------------------------  Current Outpatient Medications   Medication Sig Dispense Refill     amitriptyline (ELAVIL) 100 MG tablet Take 100 mg by mouth       ARIPiprazole (ABILIFY) 10 MG tablet Take 10 mg by mouth       LAMICTAL 100 MG PO TABS 1 TABLET DAILY       LamoTRIgine (LAMICTAL PO) Take 200 mg by mouth At Bedtime       omeprazole (PRILOSEC) 20 MG DR capsule TAKE 1 CAPSULE BY MOUTH EVERY MORNING BEFORE A MEAL 28 capsule 11     pravastatin (PRAVACHOL) 40 MG tablet Take 1 tablet (40 mg) by mouth At Bedtime 90 tablet 3     tiotropium (SPIRIVA HANDIHALER) 18 MCG inhaled capsule INHALE CONTENTS OF ONE CAPSULE DAILY USING HANDIHALER DEVICE 30 capsule 11     TRAZODONE HCL PO Take 150 mg by mouth At Bedtime        triamcinolone (KENALOG) 0.1 % cream Apply  topically to affected area(s) 2 times daily.       VENTOLIN  (90 Base) MCG/ACT inhaler INHALE 1 TO 2 PUFFS BY MOUTH EVERY SIX HOURS AS NEEDED FOR SHORTNESS OF BREATH 18 g 11       Allergies:  -------------  Allergies   Allergen Reactions     Aminoglycosides Swelling     Codeine Hives     Ibuprofen GI Disturbance     Reported by group home- sick to his stomach         Neosporin Rash     Amoxicillin Nausea and Vomiting, Swelling and Rash     Per patient body swells up       Codeine Nausea and Vomiting and Nausea     Neomycin-Bacitracin-Polymyxin Hives     Neosporin       Social History:  -------------------  Social History     Socioeconomic History     Marital status: Single     Spouse name: Not on file     Number of children: Not on  file     Years of education: Not on file     Highest education level: Not on file   Occupational History     Not on file   Social Needs     Financial resource strain: Not on file     Food insecurity:     Worry: Not on file     Inability: Not on file     Transportation needs:     Medical: Not on file     Non-medical: Not on file   Tobacco Use     Smoking status: Current Every Day Smoker     Packs/day: 0.50     Years: 30.00     Pack years: 15.00     Types: Cigarettes     Smokeless tobacco: Never Used   Substance and Sexual Activity     Alcohol use: No     Drug use: No     Sexual activity: Yes     Partners: Female   Lifestyle     Physical activity:     Days per week: Not on file     Minutes per session: Not on file     Stress: Not on file   Relationships     Social connections:     Talks on phone: Not on file     Gets together: Not on file     Attends Christian service: Not on file     Active member of club or organization: Not on file     Attends meetings of clubs or organizations: Not on file     Relationship status: Not on file     Intimate partner violence:     Fear of current or ex partner: Not on file     Emotionally abused: Not on file     Physically abused: Not on file     Forced sexual activity: Not on file   Other Topics Concern     Parent/sibling w/ CABG, MI or angioplasty before 65F 55M? Not Asked   Social History Narrative     Not on file       Family Medical History:  ------------------------------  Family History   Problem Relation Age of Onset     Psychotic Disorder Mother      Depression Mother      Skin Cancer Mother      Prostate Cancer Maternal Grandfather          ROS:  REVIEW OF SYSTEMS:    RESP: negative for cough, dyspnea, wheezing, hemoptysis  CV: negative for palpitations, PND, CHAPPELL, orthopnea; reports no significant changes in their ability to perform physical activity (from cardiovascular standpoint)  GI: negative for dysphagia, N/V, pain, melena, diarrhea and constipation  NEURO: negative  for new numbness/tingling, paralysis, incoordination, or focal weakness     OBJECTIVE:                                                    /80   Pulse 94   Temp 98.8  F (37.1  C) (Temporal)   Resp 14   Wt 76.2 kg (168 lb)   SpO2 95%   BMI 24.11 kg/m       GENERAL alert and no distress  EYES:  Normal sclera,conjunctiva, EOMI  HENT: oral and posterior pharynx without lesions or erythema, facies symmetric  NECK: Neck supple. No LAD, without thyroidmegaly.  RESP: Clear to ausculation bilaterally without wheezes or crackles. Normal BS in all fields.  CV: RRR normal S1S2 without murmurs, rubs or gallops.  Mild discomfort with palptio of the right anterior chest wall, somewhat similar, but not totally similar to the discomfort he senses.   LYMPH: no cervical lymph adenopathy appreciated  MS: extremities- no gross deformities of the visible extremities noted,   EXT:  no lower extremity edema  PSYCH: Alert and oriented times 3; speech- coherent  SKIN:  No obvious significant skin lesions on visible portions of face     Labs reviewed in Epic    EKG (8/13/2019) - appears normal, NSR, normal axis, normal intervals, no acute ST/T changes c/w acute ischemia, no LVH by voltage criteria, prior anteroseptal infarct unchanged from previous tracings of 2009, 2010, and 2012     ASSESSMENT/PLAN:                                                      (R07.89) Chest wall pain  (primary encounter diagnosis)  Comment: Musculoskeletal soft tissue chest wall pain.    No evidence for rib fracture.  Based on exam and history, CXR not indicated.  I offered one the the patient anyway, but they declined.   NSAIDs, stretching and increased activity as tolerated.   Can expect waxing and waning course of gradual improvement over next few weeks.  Discussed risk of PNE and atelectasis with underventilation. Asked them to brace well with coughing and to deep breathe as much as possible to help keep the lungs expanded as to avoid infection.     Told to return if any changes in symptoms, including shortness of breath, dyspnea on exertion, fevers, chills, productive cough.     Plan: EKG 12-lead complete w/read - Clinics            (R07.9) Chest pain, unspecified type  Comment: has risk factors for coronary artery disease, should get stress echo to make sure nothing going on.   Plan: Echocardiogram Exercise Stress            (J44.9) COPD mixed type (H)  Comment: This condition is currently controlled on the current medical regimen.  Continue current therapy.   Plan:     (R94.31) Abnormal electrocardiogram  Comment: prior anteroseptal infarct possible.   Get stress echo to make sure no active ichemia.   Plan: Echocardiogram Exercise Stress               See Patient Instructions    IAN HARP M.D., MD  [facility]    (Chart documentation may have been completed, in part, with [employer] voice-recognition software. Even though reviewed, some grammatical, spelling, and word errors may remain.)

## 2019-08-13 NOTE — PATIENT INSTRUCTIONS
*  I do no think that there is a specific heart problem present, but we should perform some further testing to confirm that there is no heart reated issue.    *  Return to see primary MD (Dr Rocha) in 1 month, sooner if needed.  Use [employer] or Call [phone] to schedule this appointment.

## 2019-09-06 ENCOUNTER — OFFICE VISIT (OUTPATIENT)
Dept: URGENT CARE | Facility: URGENT CARE | Age: 59
End: 2019-09-06
Payer: COMMERCIAL

## 2019-09-06 ENCOUNTER — HOSPITAL ENCOUNTER (EMERGENCY)
Facility: CLINIC | Age: 59
Discharge: HOME | End: 2019-09-06
Attending: EMERGENCY MEDICINE | Admitting: EMERGENCY MEDICINE
Payer: COMMERCIAL

## 2019-09-06 VITALS — SYSTOLIC BLOOD PRESSURE: 155 MMHG | DIASTOLIC BLOOD PRESSURE: 76 MMHG | TEMPERATURE: 98.2 F | RESPIRATION RATE: 18 BRPM

## 2019-09-06 VITALS
TEMPERATURE: 98.1 F | DIASTOLIC BLOOD PRESSURE: 80 MMHG | SYSTOLIC BLOOD PRESSURE: 140 MMHG | HEART RATE: 78 BPM | RESPIRATION RATE: 20 BRPM

## 2019-09-06 DIAGNOSIS — T18.5XXA FOREIGN BODY OF RECTUM, INITIAL ENCOUNTER: ICD-10-CM

## 2019-09-06 DIAGNOSIS — T18.5XXA FOREIGN BODY OF RECTUM, INITIAL ENCOUNTER: Primary | ICD-10-CM

## 2019-09-06 PROCEDURE — 99283 EMERGENCY DEPT VISIT LOW MDM: CPT

## 2019-09-06 PROCEDURE — 99213 OFFICE O/P EST LOW 20 MIN: CPT | Performed by: NURSE PRACTITIONER

## 2019-09-06 PROCEDURE — 25000132 ZZH RX MED GY IP 250 OP 250 PS 637: Performed by: EMERGENCY MEDICINE

## 2019-09-06 RX ORDER — OXYCODONE HYDROCHLORIDE 5 MG/1
5 TABLET ORAL ONCE
Status: COMPLETED | OUTPATIENT
Start: 2019-09-06 | End: 2019-09-06

## 2019-09-06 RX ADMIN — OXYCODONE HYDROCHLORIDE 5 MG: 5 TABLET ORAL at 21:44

## 2019-09-06 ASSESSMENT — ENCOUNTER SYMPTOMS: RECTAL PAIN: 1

## 2019-09-06 NOTE — ED AVS SNAPSHOT
Emergency Department  [address] [place] [address]  Phone:  [phone]  Fax:  [phone]                                    Sandro Arndt   MRN: [id number]    Department:   Emergency Department   Date of Visit:  9/6/2019           After Visit Summary Signature Page    I have received my discharge instructions, and my questions have been answered. I have discussed any challenges I see with this plan with the nurse or doctor.    ..........................................................................................................................................  Patient/Patient Representative Signature      ..........................................................................................................................................  Patient Representative Print Name and Relationship to Patient    ..................................................               ................................................  Date                                   Time    ..........................................................................................................................................  Reviewed by Signature/Title    ...................................................              ..............................................  Date                                               Time          22EPIC Rev 08/18

## 2019-09-07 NOTE — ED PROVIDER NOTES
History     Chief Complaint:  Foreign Body in Rectum    HPI   Sandro Arndt is a 58 year old male who carries a diagnosis of borderline personality disorder and schizoaffective disorder who presents for the evaluation of foreign body in rectum. The patient reports that earlier this evening he was using a butt plug for masturbation and had it get stuck in his rectum, prompting him to the ED. The patient notes that they attempted to pull the butt plug, but were unable to and that it is eight inches long. The patient also notes that the butt plug is logged in his rectum backwards. He states that he is experiencing extreme rectal pain and that his pain level is a 10/10. The patient denies other issues.      Allergies:  Aminoglycosides   Codeine  Ibuprofen  Neosporin  Amoxicillin  Codeine  Neomycin-Bacitracin-polymyxin      Medications:    Elavil  Abilify  Lamictal  Prilosec  Pravachol  Spiriva  Trazodone  Ventolin     Past Medical History:    Borderline high cholesterol  Chronic neck pain  COPD  Polysubstance abuse  Schizoaffective disorder  Suicidal ideation  Borderline personality disorder  GERD  Alcohol abuse     Past Surgical History:    History reviewed. No pertinent surgical history.     Family History:    Psychotic disorder  Depression    Social History:  Smoking status: Current Every Day Smoker  Alcohol use: No  Drug use: No    Marital Status:  Single [1]     Review of Systems   Gastrointestinal: Positive for rectal pain.   All other systems reviewed and are negative.        Physical Exam     Patient Vitals for the past 24 hrs:   BP Temp Temp src Resp   09/06/19 2116 -- 98.2  F (36.8  C) Temporal 18   09/06/19 2112 (!) 155/76 -- -- --        Physical Exam  GENERAL: well developed, pleasant. Lying on his side.  HEAD: atraumatic  EYES: pupils reactive, extraocular muscles intact, conjunctivae normal  ENT:  mucus membranes moist  NECK:  trachea midline, normal range of motion  RESPIRATORY: no tachypnea, breath  "sounds clear to auscultation   CVS: normal S1/S2, no murmurs, intact distal pulses  ABDOMEN: soft, nontender, nondistention  Rectal exam: Shows foreign body.   MUSCULOSKELETAL: no deformities  SKIN: warm and dry, no acute rashes or ulceration  NEURO: GCS 15, cranial nerves intact, alert and oriented x3  PSYCH:  Mood/affect normal    Emergency Department Course     Procedures:     Foreign Body Removal     LOCATION:  Rectum    Procedure:  Finger exam dislodged foreign body and gentle traction and rotation with frog legging, and repeat manipulation, FB removed.    Interventions:  Medications   oxyCODONE (ROXICODONE) tablet 5 mg (5 mg Oral Given 9/6/19 2144)     2144, Roxicodone, 5 mg, PO    Emergency Department Course:  Past medical records, nursing notes, and vitals reviewed.  2130: I performed an exam of the patient and obtained history, as documented above.     Rectal exam was performed here in the emergency department. This was performed with chaperone at bedside.     2220: I rechecked the patient. Explained findings to patient.     2242: I rechecked the patient.  Findings and plan explained to the Patient. Patient discharged home with instructions regarding supportive care, medications, and reasons to return. The importance of close follow-up was reviewed.        Impression & Plan    Medical Decision Making:    Presents with rectal foreign body.  He notes it is a \"butt plug\" and is inserted backwards.  He states he was playing with it and \"it was lubricated and his rectum sucked it up.\"  Placing him in stirrups I was able to push it off of the bony ridge that it was lodged on and eventually deliver it out his rectum.  At this point it was difficult to fully remove due to the plug being in backwards.  It was corkscrewed and grasped with a ring forceps as well as both fingers on either side as well as going into the anus to help pull.  I enlisted my partner Dr. Dalton to help remove this foreign body.  Foreign body " was successfully removed with placing legs in further frog leg position.  Patient felt better afterwards and was watched to see that no other issues occurred.  Patient looks safe for discharge.  Patient will be discharged back to the group home.    Diagnosis:    ICD-10-CM    1. Foreign body of rectum, initial encounter T18.5XXA        Disposition:  discharged to home    Scribe Disclosure:  I, Ta Haney, am serving as a scribe at 9:40 PM on 9/6/2019 to document services personally performed by Cuate Stern MD based on my observations and the provider's statements to me.      Ta Haney  9/6/2019    EMERGENCY DEPARTMENT       Cuate Stern MD  09/06/19 6472

## 2019-09-07 NOTE — PROGRESS NOTES
SUBJECTIVE:   Sandro Arndt is a 58 year old male presenting with a chief complaint of a sex toy in his rectum.  He was using a buttplug this afternoon and his hand slipped and the whole butt plug went into his rectum.  He has been trying to get it out all afternoon, but has not been able to and now has excruciating pain.  No vomiting. No fevers.  He comes in from a group home setting with staff present in the waiting room.    Past Medical History:   Diagnosis Date     Borderline high cholesterol      Chronic neck pain     s/p traumatic fracture     COPD (chronic obstructive pulmonary disease) (H)      OD (overdose of drug) 6/21/2012    Overview:  With Ambien - Wanted myself to be heard     Polysubstance abuse (H)     CD Rx x 2     Pulmonary function studies abnormal     abn. DCLO     Schizoaffective disorder, bipolar type (H)      Suicidal ideation 1/23/2011     Tobacco abuse      Current Outpatient Medications   Medication Sig Dispense Refill     amitriptyline (ELAVIL) 100 MG tablet Take 100 mg by mouth       ARIPiprazole (ABILIFY) 10 MG tablet Take 10 mg by mouth       LAMICTAL 100 MG PO TABS 1 TABLET DAILY       LamoTRIgine (LAMICTAL PO) Take 200 mg by mouth At Bedtime       omeprazole (PRILOSEC) 20 MG DR capsule TAKE 1 CAPSULE BY MOUTH EVERY MORNING BEFORE A MEAL 28 capsule 11     pravastatin (PRAVACHOL) 40 MG tablet Take 1 tablet (40 mg) by mouth At Bedtime 90 tablet 3     tiotropium (SPIRIVA HANDIHALER) 18 MCG inhaled capsule INHALE CONTENTS OF ONE CAPSULE DAILY USING HANDIHALER DEVICE 30 capsule 11     TRAZODONE HCL PO Take 150 mg by mouth At Bedtime        triamcinolone (KENALOG) 0.1 % cream Apply  topically to affected area(s) 2 times daily.       VENTOLIN  (90 Base) MCG/ACT inhaler INHALE 1 TO 2 PUFFS BY MOUTH EVERY SIX HOURS AS NEEDED FOR SHORTNESS OF BREATH 18 g 11     Social History     Tobacco Use     Smoking status: Current Every Day Smoker     Packs/day: 0.50     Years: 30.00     Pack years:  15.00     Types: Cigarettes     Smokeless tobacco: Never Used   Substance Use Topics     Alcohol use: No       ROS:  Review of systems negative except as stated above.    OBJECTIVE:  BP (!) 140/80 (Cuff Size: Adult Regular)   Pulse 78   Temp 98.1  F (36.7  C) (Oral)   Resp 20   GENERAL APPEARANCE: Alert and appears in pain  ABDOMEN:  soft, nontender, no HSM or masses and bowel sounds normal  Rectal exam: rectum with liquid stool draining, no obvious foreign body visualized  SKIN: no suspicious lesions or rashes    ASSESSMENT:  Retained foreign body in rectum    PLAN:  Recommended patient present to the ED for removal given extensive pain. Group home staff present and can drive patient to the ED.  Called [facility] ED and informed them of patient coming by private car.   See orders in Epic    MIRTA Staples, CNP

## 2019-09-07 NOTE — ED TRIAGE NOTES
"Pt states has a \"butt plug\" logged in his rectum. Pt states is sanchez and is unable to retrieve the plug from rectum. This happened earlier in the afternoon  "

## 2019-09-17 NOTE — PROGRESS NOTES
Surgical Office Location:  [facility]  [address] [place] [id number]

## 2019-09-20 ENCOUNTER — OFFICE VISIT (OUTPATIENT)
Dept: INTERNAL MEDICINE | Facility: CLINIC | Age: 59
End: 2019-09-20
Payer: COMMERCIAL

## 2019-09-20 ENCOUNTER — MEDICAL CORRESPONDENCE (OUTPATIENT)
Dept: HEALTH INFORMATION MANAGEMENT | Facility: CLINIC | Age: 59
End: 2019-09-20

## 2019-09-20 VITALS
WEIGHT: 161 LBS | HEART RATE: 104 BPM | DIASTOLIC BLOOD PRESSURE: 62 MMHG | SYSTOLIC BLOOD PRESSURE: 118 MMHG | HEIGHT: 70 IN | OXYGEN SATURATION: 96 % | BODY MASS INDEX: 23.05 KG/M2

## 2019-09-20 DIAGNOSIS — T18.5XXD: Primary | ICD-10-CM

## 2019-09-20 DIAGNOSIS — F25.0 SCHIZOAFFECTIVE DISORDER, BIPOLAR TYPE (H): ICD-10-CM

## 2019-09-20 PROCEDURE — 99213 OFFICE O/P EST LOW 20 MIN: CPT | Performed by: INTERNAL MEDICINE

## 2019-09-20 ASSESSMENT — MIFFLIN-ST. JEOR: SCORE: 1566.54

## 2019-09-20 NOTE — PROGRESS NOTES
"Subjective     Sandro Arndt is a 58 year old male who presents to clinic today for the following health issues:    HPI   ED/UC Followup:    Facility:  [facility]  Date of visit: 9/6/19  Reason for visit:   Current Status: stable     Patient presented to the ER with foreign body in his rectum.  He had inserted a but plug backwards and it became stuck.  He ER physicians were able to remove the foreign body after considerable effort.  He denies any rectal pain or bleeding at this time and states he feels quite well.      Reviewed and updated as needed this visit by Provider         Review of Systems         Objective    /62   Pulse 104   Ht 1.778 m (5' 10\")   Wt 73 kg (161 lb)   SpO2 96%   BMI 23.10 kg/m    Body mass index is 23.1 kg/m .  Physical Exam   GENERAL APPEARANCE: healthy, alert and no distress          Assessment & Plan     1. Foreign body in anus and rectum, subsequent encounter  -Successfully removed.  No further Rx per as needed          Tobacco Cessation:   reports that he has been smoking cigarettes.  He has a 15.00 pack-year smoking history. He has never used smokeless tobacco.          See Patient Instructions    No follow-ups on file.    Cory Gleason MD  [facility]        "

## 2019-09-26 ENCOUNTER — OFFICE VISIT (OUTPATIENT)
Dept: DERMATOLOGY | Facility: CLINIC | Age: 59
End: 2019-09-26
Payer: COMMERCIAL

## 2019-09-26 ENCOUNTER — TELEPHONE (OUTPATIENT)
Dept: INTERNAL MEDICINE | Facility: CLINIC | Age: 59
End: 2019-09-26

## 2019-09-26 VITALS — OXYGEN SATURATION: 98 % | SYSTOLIC BLOOD PRESSURE: 120 MMHG | DIASTOLIC BLOOD PRESSURE: 86 MMHG | HEART RATE: 106 BPM

## 2019-09-26 DIAGNOSIS — C44.612 BASAL CELL CARCINOMA (BCC) OF RIGHT SHOULDER: Primary | ICD-10-CM

## 2019-09-26 PROCEDURE — 88331 PATH CONSLTJ SURG 1 BLK 1SPC: CPT | Performed by: DERMATOLOGY

## 2019-09-26 PROCEDURE — 11602 EXC TR-EXT MAL+MARG 1.1-2 CM: CPT | Performed by: DERMATOLOGY

## 2019-09-26 NOTE — LETTER
9/26/2019         RE: Sandro Arndt  [address] [facility] [address]        Dear Colleague,    Thank you for referring your patient, Sandro Arndt, to the [facility]. Please see a copy of my visit note below.    Surgical Office Location:  [facility]  [address] [place] [id number]      Sandro Arndt is a 58 year old year old male patient here today for evaluation and managment of basal cell carcinoma on shoulder.  Patient reports the following modifying factors none.  Associated symptoms: none.  Patient has no other skin complaints today.  Remainder of the HPI, Meds, PMH, Allergies, FH, and SH was reviewed in chart.      Past Medical History:   Diagnosis Date     Basal cell carcinoma      Borderline high cholesterol      Chronic neck pain     s/p traumatic fracture     COPD (chronic obstructive pulmonary disease) (H)      OD (overdose of drug) 6/21/2012    Overview:  With Ambien - Wanted myself to be heard     Polysubstance abuse (H)     CD Rx x 2     Pulmonary function studies abnormal     abn. DCLO     Schizoaffective disorder, bipolar type (H)      Suicidal ideation 1/23/2011     Tobacco abuse        Past Surgical History:   Procedure Laterality Date     C OPEN RX MANDIBLE FX       COLONOSCOPY N/A 6/18/2019    Procedure: COLONOSCOPY;  Surgeon: Clarence Fuentes MD;  Location:  GI     COLONOSCOPY N/A 6/27/2019    Procedure: COLONOSCOPY;  Surgeon: Clarence Fuentes MD;  Location:  GI     GENITOURINARY SURGERY          Family History   Problem Relation Age of Onset     Psychotic Disorder Mother      Depression Mother      Skin Cancer Mother      Prostate Cancer Maternal Grandfather        Social History     Socioeconomic History     Marital status: Single     Spouse name: Not on file     Number of children: Not on file     Years of education: Not on file     Highest education level: Not on file   Occupational History     Not on file   Social Needs      Financial resource strain: Not on file     Food insecurity:     Worry: Not on file     Inability: Not on file     Transportation needs:     Medical: Not on file     Non-medical: Not on file   Tobacco Use     Smoking status: Current Every Day Smoker     Packs/day: 0.50     Years: 30.00     Pack years: 15.00     Types: Cigarettes     Smokeless tobacco: Never Used   Substance and Sexual Activity     Alcohol use: No     Drug use: No     Sexual activity: Not Currently     Partners: Male   Lifestyle     Physical activity:     Days per week: Not on file     Minutes per session: Not on file     Stress: Not on file   Relationships     Social connections:     Talks on phone: Not on file     Gets together: Not on file     Attends Restorationism service: Not on file     Active member of club or organization: Not on file     Attends meetings of clubs or organizations: Not on file     Relationship status: Not on file     Intimate partner violence:     Fear of current or ex partner: Not on file     Emotionally abused: Not on file     Physically abused: Not on file     Forced sexual activity: Not on file   Other Topics Concern     Parent/sibling w/ CABG, MI or angioplasty before 65F 55M? Not Asked   Social History Narrative     Not on file       Outpatient Encounter Medications as of 9/26/2019   Medication Sig Dispense Refill     amitriptyline (ELAVIL) 100 MG tablet Take 100 mg by mouth       ARIPiprazole (ABILIFY) 10 MG tablet Take 10 mg by mouth       LAMICTAL 100 MG PO TABS 1 TABLET DAILY       LamoTRIgine (LAMICTAL PO) Take 200 mg by mouth At Bedtime       omeprazole (PRILOSEC) 20 MG DR capsule TAKE 1 CAPSULE BY MOUTH EVERY MORNING BEFORE A MEAL 28 capsule 11     pravastatin (PRAVACHOL) 40 MG tablet Take 1 tablet (40 mg) by mouth At Bedtime 90 tablet 3     tiotropium (SPIRIVA HANDIHALER) 18 MCG inhaled capsule INHALE CONTENTS OF ONE CAPSULE DAILY USING HANDIHALER DEVICE 30 capsule 11     TRAZODONE HCL PO Take 150 mg by mouth At  Bedtime        triamcinolone (KENALOG) 0.1 % cream Apply  topically to affected area(s) 2 times daily.       VENTOLIN  (90 Base) MCG/ACT inhaler INHALE 1 TO 2 PUFFS BY MOUTH EVERY SIX HOURS AS NEEDED FOR SHORTNESS OF BREATH 18 g 11     No facility-administered encounter medications on file as of 9/26/2019.              Review Of Systems  Skin: As above  Eyes: negative  Ears/Nose/Throat: negative  Respiratory: No shortness of breath, dyspnea on exertion, cough, or hemoptysis  Cardiovascular: negative  Gastrointestinal: negative  Genitourinary: negative  Musculoskeletal: negative  Neurologic: negative  Psychiatric: negative  Hematologic/Lymphatic/Immunologic: negative  Endocrine: negative      O:   NAD, WDWN, Alert & Oriented, Mood & Affect wnl, Vitals stable   Here today alone   /86   Pulse 106   SpO2 98%    General appearance normal   Vitals stable   Alert, oriented and in no acute distress     R post shoulder 7mm scaly papule   Eyes: Conjunctivae/lids:Normal     ENT: Lips, buccal mucosa, tongue: normal    MSK:Normal    Cardiovascular: peripheral edema none    Pulm: Breathing Normal    Neuro/Psych: Orientation:Normal; Mood/Affect:Normal      MICRO:   R post shoulder:Unremarkable epidermis with parallel bundles of cellular collagen within the superficial dermis.  No concerning areas for malignancy.     A/P:  1. R post shoulder basal cell carcinoma   EXCISION OF BASAL CELL CARCINOMA, Margins confirmed with FROZEN SECTIONS AND Second intent: After thorough discussion of PGACAC, consent obtained, anesthesia and prep, the margins of the lesion were identified and an elliptical incision was made encompassing the lesion with 4mm margin. The incisions were made through the skin and down to and including the superficial dermis.  The lesion was removed en bloc and submitted for frozen section pathologic review. Clear margins obtained (1.4cm).    REPAIR SECOND INTENT: We discussed the options for wound  management in full with the patient including risks/benefits/possible outcomes. Decision made to allow the wound to heal by second intention. EBL minimal; complications none; wound care routine.  The patient was discharged in good condition and will return in one month or prn for wound evaluation.     BENIGN LESIONS DISCUSSED WITH PATIENT:  I discussed the specifics of tumor, prognosis, and genetics of benign lesions.  I explained that treatment of these lesions would be purely cosmetic and not medically neccessary.  I discussed with patient different removal options including excision, cautery and /or laser.      Nature and genetics of benign skin lesions dicussed with patient.  Signs and Symptoms of skin cancer discussed with patient.  Patient encouraged to perform monthly skin exams.  UV precautions reviewed with patient.  Skin care regimen reviewed with patient: Eliminate harsh soaps, i.e. Dial, zest, irsih spring; Mild soaps such as Cetaphil or Dove sensitive skin, avoid hot or cold showers, aggressive use of emollients including vanicream, cetaphil or cerave discussed with patient.    Risks of non-melanoma skin cancer discussed with patient   Return to clinic 6 months      Again, thank you for allowing me to participate in the care of your patient.        Sincerely,        Aaron Lopez MD

## 2019-09-26 NOTE — TELEPHONE ENCOUNTER
Reason for Call:  Form, our goal is to have forms completed with 72 hours, however, some forms may require a visit or additional information.    Type of letter, form or note:  medical    Who is the form from?: [facility]    Where did the form come from: form was faxed in    What clinic location was the form placed at?: Internal Medicine    Where the form was placed: Given to physician    What number is listed as a contact on the form?: [phone]       Additional comments: medical referral    Call taken on 9/26/2019 at 2:21 PM by Maida Resendez

## 2019-09-26 NOTE — PROGRESS NOTES
Sandro Arndt is a 58 year old year old male patient here today for evaluation and managment of basal cell carcinoma on shoulder.  Patient reports the following modifying factors none.  Associated symptoms: none.  Patient has no other skin complaints today.  Remainder of the HPI, Meds, PMH, Allergies, FH, and SH was reviewed in chart.      Past Medical History:   Diagnosis Date     Basal cell carcinoma      Borderline high cholesterol      Chronic neck pain     s/p traumatic fracture     COPD (chronic obstructive pulmonary disease) (H)      OD (overdose of drug) 6/21/2012    Overview:  With Ambien - Wanted myself to be heard     Polysubstance abuse (H)     CD Rx x 2     Pulmonary function studies abnormal     abn. DCLO     Schizoaffective disorder, bipolar type (H)      Suicidal ideation 1/23/2011     Tobacco abuse        Past Surgical History:   Procedure Laterality Date     C OPEN RX MANDIBLE FX       COLONOSCOPY N/A 6/18/2019    Procedure: COLONOSCOPY;  Surgeon: Clarence Fuentes MD;  Location:  GI     COLONOSCOPY N/A 6/27/2019    Procedure: COLONOSCOPY;  Surgeon: Clarence Fuentes MD;  Location:  GI     GENITOURINARY SURGERY          Family History   Problem Relation Age of Onset     Psychotic Disorder Mother      Depression Mother      Skin Cancer Mother      Prostate Cancer Maternal Grandfather        Social History     Socioeconomic History     Marital status: Single     Spouse name: Not on file     Number of children: Not on file     Years of education: Not on file     Highest education level: Not on file   Occupational History     Not on file   Social Needs     Financial resource strain: Not on file     Food insecurity:     Worry: Not on file     Inability: Not on file     Transportation needs:     Medical: Not on file     Non-medical: Not on file   Tobacco Use     Smoking status: Current Every Day Smoker     Packs/day: 0.50     Years: 30.00     Pack years: 15.00     Types: Cigarettes     Smokeless  tobacco: Never Used   Substance and Sexual Activity     Alcohol use: No     Drug use: No     Sexual activity: Not Currently     Partners: Male   Lifestyle     Physical activity:     Days per week: Not on file     Minutes per session: Not on file     Stress: Not on file   Relationships     Social connections:     Talks on phone: Not on file     Gets together: Not on file     Attends Latter day service: Not on file     Active member of club or organization: Not on file     Attends meetings of clubs or organizations: Not on file     Relationship status: Not on file     Intimate partner violence:     Fear of current or ex partner: Not on file     Emotionally abused: Not on file     Physically abused: Not on file     Forced sexual activity: Not on file   Other Topics Concern     Parent/sibling w/ CABG, MI or angioplasty before 65F 55M? Not Asked   Social History Narrative     Not on file       Outpatient Encounter Medications as of 9/26/2019   Medication Sig Dispense Refill     amitriptyline (ELAVIL) 100 MG tablet Take 100 mg by mouth       ARIPiprazole (ABILIFY) 10 MG tablet Take 10 mg by mouth       LAMICTAL 100 MG PO TABS 1 TABLET DAILY       LamoTRIgine (LAMICTAL PO) Take 200 mg by mouth At Bedtime       omeprazole (PRILOSEC) 20 MG DR capsule TAKE 1 CAPSULE BY MOUTH EVERY MORNING BEFORE A MEAL 28 capsule 11     pravastatin (PRAVACHOL) 40 MG tablet Take 1 tablet (40 mg) by mouth At Bedtime 90 tablet 3     tiotropium (SPIRIVA HANDIHALER) 18 MCG inhaled capsule INHALE CONTENTS OF ONE CAPSULE DAILY USING HANDIHALER DEVICE 30 capsule 11     TRAZODONE HCL PO Take 150 mg by mouth At Bedtime        triamcinolone (KENALOG) 0.1 % cream Apply  topically to affected area(s) 2 times daily.       VENTOLIN  (90 Base) MCG/ACT inhaler INHALE 1 TO 2 PUFFS BY MOUTH EVERY SIX HOURS AS NEEDED FOR SHORTNESS OF BREATH 18 g 11     No facility-administered encounter medications on file as of 9/26/2019.              Review Of  Systems  Skin: As above  Eyes: negative  Ears/Nose/Throat: negative  Respiratory: No shortness of breath, dyspnea on exertion, cough, or hemoptysis  Cardiovascular: negative  Gastrointestinal: negative  Genitourinary: negative  Musculoskeletal: negative  Neurologic: negative  Psychiatric: negative  Hematologic/Lymphatic/Immunologic: negative  Endocrine: negative      O:   NAD, WDWN, Alert & Oriented, Mood & Affect wnl, Vitals stable   Here today alone   /86   Pulse 106   SpO2 98%    General appearance normal   Vitals stable   Alert, oriented and in no acute distress     R post shoulder 7mm scaly papule   Eyes: Conjunctivae/lids:Normal     ENT: Lips, buccal mucosa, tongue: normal    MSK:Normal    Cardiovascular: peripheral edema none    Pulm: Breathing Normal    Neuro/Psych: Orientation:Normal; Mood/Affect:Normal      MICRO:   R post shoulder:Unremarkable epidermis with parallel bundles of cellular collagen within the superficial dermis.  No concerning areas for malignancy.     A/P:  1. R post shoulder basal cell carcinoma   EXCISION OF BASAL CELL CARCINOMA, Margins confirmed with FROZEN SECTIONS AND Second intent: After thorough discussion of PGACAC, consent obtained, anesthesia and prep, the margins of the lesion were identified and an elliptical incision was made encompassing the lesion with 4mm margin. The incisions were made through the skin and down to and including the superficial dermis.  The lesion was removed en bloc and submitted for frozen section pathologic review. Clear margins obtained (1.4cm).    REPAIR SECOND INTENT: We discussed the options for wound management in full with the patient including risks/benefits/possible outcomes. Decision made to allow the wound to heal by second intention. EBL minimal; complications none; wound care routine.  The patient was discharged in good condition and will return in one month or prn for wound evaluation.     BENIGN LESIONS DISCUSSED WITH PATIENT:  I  discussed the specifics of tumor, prognosis, and genetics of benign lesions.  I explained that treatment of these lesions would be purely cosmetic and not medically neccessary.  I discussed with patient different removal options including excision, cautery and /or laser.      Nature and genetics of benign skin lesions dicussed with patient.  Signs and Symptoms of skin cancer discussed with patient.  Patient encouraged to perform monthly skin exams.  UV precautions reviewed with patient.  Skin care regimen reviewed with patient: Eliminate harsh soaps, i.e. Dial, zest, irsih spring; Mild soaps such as Cetaphil or Dove sensitive skin, avoid hot or cold showers, aggressive use of emollients including vanicream, cetaphil or cerave discussed with patient.    Risks of non-melanoma skin cancer discussed with patient   Return to clinic 6 months

## 2019-09-26 NOTE — PATIENT INSTRUCTIONS
Open Wound Care     for ______________        ? No strenuous activity for 48 hours    ? Take Tylenol as needed for discomfort.                                                .         ? Do not drink alcoholic beverages for 48 hours.    ? Keep the pressure bandage in place for 24 hours. If the bandage becomes blood tinged or loose, reinforce it with gauze and tape.        (Refer to the reverse side of this page for management of bleeding).    ? Remove bandage in 24 hours and begin wound care as follows:     1. Clean area with tap water using a Q tip or gauze pad, (shower / bathe normally)  2. Dry wound with Q tip or gauze pad  3. Apply Aquaphor, Vaseline, Polysporin or Bacitracin Ointment with a Q tip    Do NOT use Neosporin Ointment *  4. Cover the wound with a band-aid or nonstick gauze pad and paper tape.  5. Repeat wound care once a day until wound is completely healed.    It is an old wives tale that a wound heals better when it is exposed to air and allowed to dry out. The wound will heal faster with a better cosmetic result if it is kept moist with ointment and covered with a bandage.  Do not let the wound dry out.      Supplies Needed:                Qtips or gauze pads                Polysporin or Bacitracin Ointment                Bandaids or nonstick gauze pads and paper tape    Wound care kits and brown paper tape are available for purchase at   the pharmacy.    BLEEDIN. Use tightly rolled up gauze or cloth to apply direct pressure over the bandage for 20   minutes.  2. Reapply pressure for an additional 20 minutes if necessary  3. Call the office or go to the nearest emergency room if pressure fails to stop the bleeding.  4. Use additional gauze and tape to maintain pressure once the bleeding has stopped.  5. Begin wound care 24 hours after surgery as directed.                  WOUND HEALING    1. One week after surgery a pink / red halo will form around the outside of the wound.   This is new  skin.  2. The center of the wound will appear yellowish white and produce some drainage.  3. The pink halo will slowly migrate in toward the center of the wound until the wound is covered with new shiny pink skin.  4. There will be no more drainage when the wound is completely healed.  5. It will take six months to one year for the redness to fade.  6. The scar may be itchy, tight and sensitive to extreme temperatures for a year after the surgery.  7. Massaging the area several times a day for several minutes after the wound is completely healed will help the scar soften and normalize faster. Begin massage only after healing is complete.      In case of emergency call: Dr Lopez: [phone]     [facility]: [phone]    [facility]: [phone]

## 2020-01-27 ENCOUNTER — TELEPHONE (OUTPATIENT)
Dept: INTERNAL MEDICINE | Facility: CLINIC | Age: 60
End: 2020-01-27

## 2020-04-09 ENCOUNTER — PATIENT OUTREACH (OUTPATIENT)
Dept: CARE COORDINATION | Facility: CLINIC | Age: 60
End: 2020-04-09

## 2020-04-09 NOTE — PROGRESS NOTES
Clinic Care Coordination Contact  [facility]/Voicemail    Referral Source: Pro-Active Outreach (payer identified)    Clinical Data: Care Coordinator Outreach  Outreach attempted x 1.  Left message on patient's voicemail with call back information and requested return call.  Plan: Care Coordinator will try to reach patient again in 1-2 business days.    Sanjay Torrez RN  Clinic Care Coordinator  [facility]  Ph: [phone]

## 2020-04-09 NOTE — LETTER
[place] CARE COORDINATION  [facility]  [address] , [place] [address]    April 14, 2020    Sandro Arndt  [address] [address]      Dear Sandro,    I am a clinic care coordinator who works with Cory Gleason MD at [facility]. I have been trying to reach you recently to introduce Clinic Care Coordination and to see if there was anything I could assist you with.  Below is a description of clinic care coordination and how I can further assist you.      The clinic care coordinator team is made up of a registered nurse,  and community health worker who understand the health care system. The goal of clinic care coordination is to help you manage your health and improve access to the health care system in the most efficient manner. The team can assist you in meeting your health care goals by providing education, coordinating services, strengthening the communication among your providers  and supporting you with any resource needs.    Please feel free to contact me with any questions or concerns. We are focused on providing you with the highest-quality healthcare experience possible and that all starts with you.     Sincerely,     Sanjay Torrez RN  Clinic Care Coordinator  [facility]  Ph: [phone]    Enclosed: I have enclosed a copy of a 24 Hour Access Plan. This has helpful phone numbers for you to call when needed. Please keep this in an easy to access place to use as needed.

## 2020-04-09 NOTE — LETTER
Health Care Home - Access Care Plan    About Me:    Patient Name:  Sandro Arndt    YOB: 1960  Age:                      59 year old   Renata MRN:     [id number] Telephone Information:   Home Phone [phone]   Mobile [phone]       Address:  [address] [address] Email address:  No e-mail address on record      Emergency Contact(s)   Name Relationship Lgl Grd Work Phone Home Phone Mobile Phone   1. MARIAH MARRERO Mother  none [phone] none   2. DECLINED PER PT Declined                 Health Maintenance:    Health Maintenance Due   Topic Date Due     ADVANCE CARE PLANNING  1960     COPD ACTION PLAN  1960     ZOSTER IMMUNIZATION (1 of 2) 12/21/2012     INFLUENZA VACCINE (1) 09/01/2019     PHQ-2  01/01/2020       My Access Plan  Medical Emergency 911   Questions or concerns during clinic hours Primary Clinic Line, I will call the clinic directly:   -     24 Hour Appointment Line [phone] or  [phone]) (toll free)   24 Hour Nurse Line [phone] (toll free)   Questions or concerns outside clinic hours 24 Hour Appointment Line, I will call the after-hours on-call line:   [facility] [phone] or [phone]) (toll-free)   Preferred Urgent Care     Preferred Hospital     Preferred Pharmacy [id number] [place], [place] - [address] [facility] [place] [address] [place] [address]     Behavioral Health Crisis Line The National Suicide Prevention Lifeline at 1-998.108.3622 or 911         My Care Team Members  Patient Care Team       Relationship Specialty Notifications Start End    Cory Gleason MD PCP - General Internal Medicine  8/26/19     Phone: [phone] Pager: [phone] Fax: [phone] [address] [place] [address]    Cory Gleason MD Assigned PCP   6/24/18     Phone: [phone] Pager: [phone] Fax: [phone] [address] [place] [address] [address] [facility]  [address]           My Medical and Care Information  Problem List   Patient Active Problem List   Diagnosis     Schizoaffective disorder, bipolar type (H)     Tobacco abuse     Borderline personality disorder (H)     COPD mixed type (H)     GERD (gastroesophageal reflux disease)     Alcohol abuse, in remission      Current Medications:  Current Outpatient Medications   Medication     amitriptyline (ELAVIL) 100 MG tablet     ARIPiprazole (ABILIFY) 10 MG tablet     LAMICTAL 100 MG PO TABS     LamoTRIgine (LAMICTAL PO)     omeprazole (PRILOSEC) 20 MG DR capsule     pravastatin (PRAVACHOL) 40 MG tablet     tiotropium (SPIRIVA HANDIHALER) 18 MCG inhaled capsule     TRAZODONE HCL PO     triamcinolone (KENALOG) 0.1 % cream     VENTOLIN  (90 Base) MCG/ACT inhaler     No current facility-administered medications for this visit.

## 2020-04-14 NOTE — PROGRESS NOTES
Clinic Care Coordination Contact  [facility]/Voicemail    Referral Source: Pro-Active Outreach (payer identified)    Clinical Data: Care Coordinator Outreach  Outreach attempted x 2.  Voicemail inbox full; unable to leave message.  Plan: Care Coordinator will send care coordination introduction letter with care coordinator contact information and explanation of care coordination services via mail. Care Coordinator will do no further outreaches at this time.    Sanjay Torrez RN  Clinic Care Coordinator  [facility]  Ph: [phone]

## 2020-04-16 ENCOUNTER — TELEPHONE (OUTPATIENT)
Dept: INTERNAL MEDICINE | Facility: CLINIC | Age: 60
End: 2020-04-16

## 2020-04-16 NOTE — TELEPHONE ENCOUNTER
What? I don't even want to know what that it is.   But I would say in general that I can't make any recommendations on any herbal OTC product.

## 2020-04-16 NOTE — TELEPHONE ENCOUNTER
Reason for Call:  Other call back    Detailed comments: Patient's group home has a question about some medications. Wondering if patient can take coby size natural mens enhancement along with his current medications. Please call to discuss.    Phone Number Patient can be reached at: Other phone number:  [phone]    Best Time: any    Can we leave a detailed message on this number? NO    Call taken on 4/16/2020 at 8:56 AM by SHIRA JAVIER

## 2020-05-20 DIAGNOSIS — J43.9 PULMONARY EMPHYSEMA, UNSPECIFIED EMPHYSEMA TYPE (H): ICD-10-CM

## 2020-05-20 DIAGNOSIS — E78.2 MIXED HYPERLIPIDEMIA: ICD-10-CM

## 2020-05-20 DIAGNOSIS — K21.9 GASTROESOPHAGEAL REFLUX DISEASE WITHOUT ESOPHAGITIS: ICD-10-CM

## 2020-05-20 RX ORDER — PRAVASTATIN SODIUM 40 MG
TABLET ORAL
Qty: 28 TABLET | Refills: 3 | Status: SHIPPED | OUTPATIENT
Start: 2020-05-20 | End: 2020-09-29

## 2020-05-20 RX ORDER — TIOTROPIUM BROMIDE 18 UG/1
CAPSULE ORAL; RESPIRATORY (INHALATION)
Qty: 30 CAPSULE | Refills: 3 | Status: SHIPPED | OUTPATIENT
Start: 2020-05-20 | End: 2020-07-27

## 2020-07-06 ENCOUNTER — OFFICE VISIT (OUTPATIENT)
Dept: DERMATOLOGY | Facility: CLINIC | Age: 60
End: 2020-07-06
Payer: COMMERCIAL

## 2020-07-06 VITALS — SYSTOLIC BLOOD PRESSURE: 135 MMHG | HEART RATE: 88 BPM | DIASTOLIC BLOOD PRESSURE: 89 MMHG | OXYGEN SATURATION: 98 %

## 2020-07-06 DIAGNOSIS — Z85.828 HISTORY OF BASAL CELL CARCINOMA (BCC): Primary | ICD-10-CM

## 2020-07-06 DIAGNOSIS — D22.9 MULTIPLE BENIGN NEVI: ICD-10-CM

## 2020-07-06 DIAGNOSIS — D18.01 CHERRY ANGIOMA: ICD-10-CM

## 2020-07-06 DIAGNOSIS — L81.4 LENTIGINES: ICD-10-CM

## 2020-07-06 PROCEDURE — 99214 OFFICE O/P EST MOD 30 MIN: CPT | Performed by: PHYSICIAN ASSISTANT

## 2020-07-06 NOTE — PATIENT INSTRUCTIONS
Proper skin care from [facility]:    -Eliminate harsh soaps as they strip the natural oils from the skin, often resulting in dry itchy skin ( i.e. Dial, Zest, [place] Spring)  -Use mild soaps such as Cetaphil or Dove Sensitive Skin in the shower. You do not need to use soap on arms, legs, and trunk every time you shower unless visibly soiled.   -Avoid hot or cold showers.  -After showering, lightly dry off and apply moisturizing within 2-3 minutes. This will help trap moisture in the skin.   -Aggressive use of a moisturizer at least 1-2 times a day to the entire body (including -Vanicream, Cetaphil, Aquaphor or Cerave) and moisturize hands after every washing.  -We recommend using moisturizers that come in a tub that needs to be scooped out, not a pump. This has more of an oil base. It will hold moisture in your skin much better than a water base moisturizer. The above recommended are non-pore clogging.      Wear a sunscreen with at least SPF 30 on your face, ears, neck and V of the chest daily. Wear sunscreen on other areas of the body if those areas are exposed to the sun throughout the day. Sunscreens can contain physical and/or chemical blockers. Physical blockers are less likely to clog pores, these include zinc oxide and titanium dioxide. Reapply every two hour and after swimming. Sunscreen examples include Neutrogena, CeraVe, Blue Lizard, Elta MD and many others.    UV radiation  UVA radiation remains constant throughout the day and throughout the year. It is a longer wavelength than UVB and therefore penetrates deeper into the skin leading to immediate and delayed tanning, photoaging, and skin cancer. 70-80% of UVA and UVB radiation occurs between the hours of 10am-2pm.  UVB radiation  UVB radiation causes the most harmful effects and is more significant during the summer months. However, snow and ice can reflect UVB radiation leading to skin damage during the winter months as well. UVB radiation is  responsible for tanning, burning, inflammation, delayed erythema (pinkness), pigmentation (brown spots), and skin cancer.

## 2020-07-06 NOTE — LETTER
7/6/2020         RE: Sandro Arndt  [address] [facility] [address]        Dear Colleague,    Thank you for referring your patient, Sandro Arndt, to the [facility]. Please see a copy of my visit note below.    HPI:  Sandro Arndt is a 59 year old male patient here today for spots on back .  Patient states this has been present for a while.  Patient reports the following symptoms: none .  Patient reports the following previous treatments: none.  Patient reports the following modifying factors: none.  Associated symptoms: none.  Patient has no other skin complaints today.  Remainder of the HPI, Meds, PMH, Allergies, FH, and SH was reviewed in chart.    Pertinent Hx:   BCC on right posterior shoulder tx by mohs excision 9/26/19  Past Medical History:   Diagnosis Date     Basal cell carcinoma      Borderline high cholesterol      Chronic neck pain     s/p traumatic fracture     COPD (chronic obstructive pulmonary disease) (H)      OD (overdose of drug) 6/21/2012    Overview:  With Ambien - Wanted myself to be heard     Polysubstance abuse (H)     CD Rx x 2     Pulmonary function studies abnormal     abn. DCLO     Schizoaffective disorder, bipolar type (H)      Suicidal ideation 1/23/2011     Tobacco abuse        Past Surgical History:   Procedure Laterality Date     C OPEN RX MANDIBLE FX       COLONOSCOPY N/A 6/18/2019    Procedure: COLONOSCOPY;  Surgeon: Clarence Fuentes MD;  Location:  GI     COLONOSCOPY N/A 6/27/2019    Procedure: COLONOSCOPY;  Surgeon: Clarence Fuentes MD;  Location:  GI     GENITOURINARY SURGERY          Family History   Problem Relation Age of Onset     Psychotic Disorder Mother      Depression Mother      Skin Cancer Mother      Prostate Cancer Maternal Grandfather        Social History     Socioeconomic History     Marital status: Single     Spouse name: Not on file     Number of children: Not on file     Years of education: Not on file     Highest  education level: Not on file   Occupational History     Not on file   Social Needs     Financial resource strain: Not on file     Food insecurity     Worry: Not on file     Inability: Not on file     Transportation needs     Medical: Not on file     Non-medical: Not on file   Tobacco Use     Smoking status: Current Every Day Smoker     Packs/day: 0.50     Years: 30.00     Pack years: 15.00     Types: Cigarettes     Smokeless tobacco: Never Used   Substance and Sexual Activity     Alcohol use: No     Drug use: No     Sexual activity: Not Currently     Partners: Male   Lifestyle     Physical activity     Days per week: Not on file     Minutes per session: Not on file     Stress: Not on file   Relationships     Social connections     Talks on phone: Not on file     Gets together: Not on file     Attends Yazidism service: Not on file     Active member of club or organization: Not on file     Attends meetings of clubs or organizations: Not on file     Relationship status: Not on file     Intimate partner violence     Fear of current or ex partner: Not on file     Emotionally abused: Not on file     Physically abused: Not on file     Forced sexual activity: Not on file   Other Topics Concern     Parent/sibling w/ CABG, MI or angioplasty before 65F 55M? Not Asked   Social History Narrative     Not on file       Outpatient Encounter Medications as of 7/6/2020   Medication Sig Dispense Refill     amitriptyline (ELAVIL) 100 MG tablet Take 100 mg by mouth       ARIPiprazole (ABILIFY) 10 MG tablet Take 10 mg by mouth       LAMICTAL 100 MG PO TABS 1 TABLET DAILY       LamoTRIgine (LAMICTAL PO) Take 200 mg by mouth At Bedtime       omeprazole (PRILOSEC) 20 MG DR capsule TAKE 1 CAPSULE BY MOUTH EVERY MORNING BEFORE A MEAL 28 capsule 3     pravastatin (PRAVACHOL) 40 MG tablet TAKE 1 TABLET BY MOUTH EVERY NIGHT AT BEDTIME 28 tablet 3     tiotropium (SPIRIVA HANDIHALER) 18 MCG inhaled capsule INHALE CONTENTS OF ONE CAPSULE DAILY  USING HANDIHALER DEVICE 30 capsule 3     TRAZODONE HCL PO Take 150 mg by mouth At Bedtime        triamcinolone (KENALOG) 0.1 % cream Apply  topically to affected area(s) 2 times daily.       VENTOLIN  (90 Base) MCG/ACT inhaler INHALE 1 TO 2 PUFFS BY MOUTH EVERY SIX HOURS AS NEEDED FOR SHORTNESS OF BREATH 18 g 11     No facility-administered encounter medications on file as of 7/6/2020.        Review Of Systems:  Skin: spots on back  Eyes: negative  Ears/Nose/Throat: negative  Respiratory: No shortness of breath, dyspnea on exertion, cough, or hemoptysis  Cardiovascular: negative  Gastrointestinal: negative  Genitourinary: negative  Musculoskeletal: negative  Neurologic: negative  Psychiatric: negative  Hematologic/Lymphatic/Immunologic: negative  Endocrine: negative      Objective:     There were no vitals taken for this visit.  Eyes: Conjunctivae/lids: Normal   ENT: Lips:  Normal  MSK: Normal  Cardiovascular: Peripheral edema none  Pulm: Breathing Normal  Neuro/Psych: Orientation: A/O x 3. Normal; Mood/Affect: Normal, NAD, WDWN  Pt accompanied by: self  Following areas examined: Scalp, face, eyelids, lips, neck, chest, abdomen, back, and R&L upper and lower extremities. Pt defers exam of buttock, hips, groin and genitals.   Orosco skin type:ii   Findings:  Red smooth well-defined macules on trunk and extremities.  Well circumscribed macules with symmetric color distribution on trunk and extremities.  Tan WD smooth macules on face, neck, trunk, and extremities.  Smooth flesh colored patch on right posterior shoulder    Assessment and Plan:     1) Cherry angiomas, Seborrheic keratoses, Benign nevi, Lentigines     I discussed the specifics of tumor, prognosis, and genetics of benign lesions.  I explained that treatment of these lesions would be purely cosmetic and not medically neccessary.  I discussed with patient different removal options including excision, cryotherapy, cautery and /or laser.  Lesion  may recur and/or may not completely resolve. May need additional treatment.     2) history of BCC  BCC on right posterior shoulder tx by mohs excision 9/26/19  Signs and Symptoms of non-melanoma skin cancer and ABCDEs of melanoma reviewed with patient. Patient encouraged to perform monthly self skin exams and educated on how to perform them. UV precautions reviewed with patient. Patient was asked about new or changing moles/lesions on body.   Wear a sunscreen with at least SPF 30 on your face, ears, neck and V of the chest daily. Wear sunscreen on other areas of the body if those areas are exposed to the sun throughout the day. Sunscreens can contain physical and/or chemical blockers. Physical blockers are less likely to clog pores, these include zinc oxide and titanium dioxide. Reapply every two hour and after swimming. Sunscreen examples include Neutrogena, CeraVe, Blue Lizard, Elta MD and many others.    Proper skin care from [facility]:    -Eliminate harsh soaps as they strip the natural oils from the skin, often resulting in dry itchy skin ( i.e. Dial, Zest, [place] Spring)  -Use mild soaps such as Cetaphil or Dove Sensitive Skin in the shower. You do not need to use soap on arms, legs, and trunk every time you shower unless visibly soiled.   -Avoid hot or cold showers.  -After showering, lightly dry off and apply moisturizing within 2-3 minutes. This will help trap moisture in the skin.   -Aggressive use of a moisturizer at least 1-2 times a day to the entire body (including -Vanicream, Cetaphil, Aquaphor or Cerave) and moisturize hands after every washing.  -We recommend using moisturizers that come in a tub that needs to be scooped out, not a pump. This has more of an oil base. It will hold moisture in your skin much better than a water base moisturizer. The above recommended are non-pore clogging.               Follow up in 6 months for FBE      Again, thank you for allowing me to participate in the  care of your patient.        Sincerely,        Tierney Iqbal PA-C

## 2020-07-06 NOTE — PROGRESS NOTES
HPI:  Sandro Arndt is a 59 year old male patient here today for spots on back .  Patient states this has been present for a while.  Patient reports the following symptoms: none .  Patient reports the following previous treatments: none.  Patient reports the following modifying factors: none.  Associated symptoms: none.  Patient has no other skin complaints today.  Remainder of the HPI, Meds, PMH, Allergies, FH, and SH was reviewed in chart.    Pertinent Hx:   BCC on right posterior shoulder tx by mohs excision 9/26/19  Past Medical History:   Diagnosis Date     Basal cell carcinoma      Borderline high cholesterol      Chronic neck pain     s/p traumatic fracture     COPD (chronic obstructive pulmonary disease) (H)      OD (overdose of drug) 6/21/2012    Overview:  With Ambien - Wanted myself to be heard     Polysubstance abuse (H)     CD Rx x 2     Pulmonary function studies abnormal     abn. DCLO     Schizoaffective disorder, bipolar type (H)      Suicidal ideation 1/23/2011     Tobacco abuse        Past Surgical History:   Procedure Laterality Date     C OPEN RX MANDIBLE FX       COLONOSCOPY N/A 6/18/2019    Procedure: COLONOSCOPY;  Surgeon: Clarence Fuentes MD;  Location:  GI     COLONOSCOPY N/A 6/27/2019    Procedure: COLONOSCOPY;  Surgeon: Clarence Fuentes MD;  Location:  GI     GENITOURINARY SURGERY          Family History   Problem Relation Age of Onset     Psychotic Disorder Mother      Depression Mother      Skin Cancer Mother      Prostate Cancer Maternal Grandfather        Social History     Socioeconomic History     Marital status: Single     Spouse name: Not on file     Number of children: Not on file     Years of education: Not on file     Highest education level: Not on file   Occupational History     Not on file   Social Needs     Financial resource strain: Not on file     Food insecurity     Worry: Not on file     Inability: Not on file     Transportation needs     Medical: Not on file      Non-medical: Not on file   Tobacco Use     Smoking status: Current Every Day Smoker     Packs/day: 0.50     Years: 30.00     Pack years: 15.00     Types: Cigarettes     Smokeless tobacco: Never Used   Substance and Sexual Activity     Alcohol use: No     Drug use: No     Sexual activity: Not Currently     Partners: Male   Lifestyle     Physical activity     Days per week: Not on file     Minutes per session: Not on file     Stress: Not on file   Relationships     Social connections     Talks on phone: Not on file     Gets together: Not on file     Attends Christianity service: Not on file     Active member of club or organization: Not on file     Attends meetings of clubs or organizations: Not on file     Relationship status: Not on file     Intimate partner violence     Fear of current or ex partner: Not on file     Emotionally abused: Not on file     Physically abused: Not on file     Forced sexual activity: Not on file   Other Topics Concern     Parent/sibling w/ CABG, MI or angioplasty before 65F 55M? Not Asked   Social History Narrative     Not on file       Outpatient Encounter Medications as of 7/6/2020   Medication Sig Dispense Refill     amitriptyline (ELAVIL) 100 MG tablet Take 100 mg by mouth       ARIPiprazole (ABILIFY) 10 MG tablet Take 10 mg by mouth       LAMICTAL 100 MG PO TABS 1 TABLET DAILY       LamoTRIgine (LAMICTAL PO) Take 200 mg by mouth At Bedtime       omeprazole (PRILOSEC) 20 MG DR capsule TAKE 1 CAPSULE BY MOUTH EVERY MORNING BEFORE A MEAL 28 capsule 3     pravastatin (PRAVACHOL) 40 MG tablet TAKE 1 TABLET BY MOUTH EVERY NIGHT AT BEDTIME 28 tablet 3     tiotropium (SPIRIVA HANDIHALER) 18 MCG inhaled capsule INHALE CONTENTS OF ONE CAPSULE DAILY USING HANDIHALER DEVICE 30 capsule 3     TRAZODONE HCL PO Take 150 mg by mouth At Bedtime        triamcinolone (KENALOG) 0.1 % cream Apply  topically to affected area(s) 2 times daily.       VENTOLIN  (90 Base) MCG/ACT inhaler INHALE 1 TO 2 PUFFS  BY MOUTH EVERY SIX HOURS AS NEEDED FOR SHORTNESS OF BREATH 18 g 11     No facility-administered encounter medications on file as of 7/6/2020.        Review Of Systems:  Skin: spots on back  Eyes: negative  Ears/Nose/Throat: negative  Respiratory: No shortness of breath, dyspnea on exertion, cough, or hemoptysis  Cardiovascular: negative  Gastrointestinal: negative  Genitourinary: negative  Musculoskeletal: negative  Neurologic: negative  Psychiatric: negative  Hematologic/Lymphatic/Immunologic: negative  Endocrine: negative      Objective:     There were no vitals taken for this visit.  Eyes: Conjunctivae/lids: Normal   ENT: Lips:  Normal  MSK: Normal  Cardiovascular: Peripheral edema none  Pulm: Breathing Normal  Neuro/Psych: Orientation: A/O x 3. Normal; Mood/Affect: Normal, NAD, WDWN  Pt accompanied by: self  Following areas examined: Scalp, face, eyelids, lips, neck, chest, abdomen, back, and R&L upper and lower extremities. Pt defers exam of buttock, hips, groin and genitals.   Orosco skin type:ii   Findings:  Red smooth well-defined macules on trunk and extremities.  Well circumscribed macules with symmetric color distribution on trunk and extremities.  Tan WD smooth macules on face, neck, trunk, and extremities.  Smooth flesh colored patch on right posterior shoulder    Assessment and Plan:     1) Cherry angiomas, Seborrheic keratoses, Benign nevi, Lentigines     I discussed the specifics of tumor, prognosis, and genetics of benign lesions.  I explained that treatment of these lesions would be purely cosmetic and not medically neccessary.  I discussed with patient different removal options including excision, cryotherapy, cautery and /or laser.  Lesion may recur and/or may not completely resolve. May need additional treatment.     2) history of BCC  BCC on right posterior shoulder tx by mohs excision 9/26/19  Signs and Symptoms of non-melanoma skin cancer and ABCDEs of melanoma reviewed with patient.  Patient encouraged to perform monthly self skin exams and educated on how to perform them. UV precautions reviewed with patient. Patient was asked about new or changing moles/lesions on body.   Wear a sunscreen with at least SPF 30 on your face, ears, neck and V of the chest daily. Wear sunscreen on other areas of the body if those areas are exposed to the sun throughout the day. Sunscreens can contain physical and/or chemical blockers. Physical blockers are less likely to clog pores, these include zinc oxide and titanium dioxide. Reapply every two hour and after swimming. Sunscreen examples include Neutrogena, CeraVe, Blue Lizard, Elta MD and many others.    Proper skin care from [facility]:    -Eliminate harsh soaps as they strip the natural oils from the skin, often resulting in dry itchy skin ( i.e. Dial, Zest, [place] Spring)  -Use mild soaps such as Cetaphil or Dove Sensitive Skin in the shower. You do not need to use soap on arms, legs, and trunk every time you shower unless visibly soiled.   -Avoid hot or cold showers.  -After showering, lightly dry off and apply moisturizing within 2-3 minutes. This will help trap moisture in the skin.   -Aggressive use of a moisturizer at least 1-2 times a day to the entire body (including -Vanicream, Cetaphil, Aquaphor or Cerave) and moisturize hands after every washing.  -We recommend using moisturizers that come in a tub that needs to be scooped out, not a pump. This has more of an oil base. It will hold moisture in your skin much better than a water base moisturizer. The above recommended are non-pore clogging.               Follow up in 6 months for FBE

## 2020-07-27 ENCOUNTER — OFFICE VISIT (OUTPATIENT)
Dept: INTERNAL MEDICINE | Facility: CLINIC | Age: 60
End: 2020-07-27
Payer: COMMERCIAL

## 2020-07-27 VITALS
WEIGHT: 170 LBS | OXYGEN SATURATION: 95 % | HEIGHT: 69 IN | TEMPERATURE: 98.4 F | SYSTOLIC BLOOD PRESSURE: 120 MMHG | HEART RATE: 93 BPM | DIASTOLIC BLOOD PRESSURE: 80 MMHG | BODY MASS INDEX: 25.18 KG/M2 | RESPIRATION RATE: 16 BRPM

## 2020-07-27 DIAGNOSIS — Z12.11 COLON CANCER SCREENING: ICD-10-CM

## 2020-07-27 DIAGNOSIS — F10.11 ALCOHOL ABUSE, IN REMISSION: ICD-10-CM

## 2020-07-27 DIAGNOSIS — F64.0 GENDER DYSPHORIA IN ADULT: ICD-10-CM

## 2020-07-27 DIAGNOSIS — Z13.6 CARDIOVASCULAR SCREENING; LDL GOAL LESS THAN 160: ICD-10-CM

## 2020-07-27 DIAGNOSIS — Z87.891 PERSONAL HISTORY OF TOBACCO USE: ICD-10-CM

## 2020-07-27 DIAGNOSIS — J43.9 PULMONARY EMPHYSEMA, UNSPECIFIED EMPHYSEMA TYPE (H): ICD-10-CM

## 2020-07-27 DIAGNOSIS — F25.0 SCHIZOAFFECTIVE DISORDER, BIPOLAR TYPE (H): ICD-10-CM

## 2020-07-27 DIAGNOSIS — J44.9 COPD MIXED TYPE (H): ICD-10-CM

## 2020-07-27 DIAGNOSIS — Z00.00 ROUTINE GENERAL MEDICAL EXAMINATION AT A HEALTH CARE FACILITY: Primary | ICD-10-CM

## 2020-07-27 DIAGNOSIS — E78.2 MIXED HYPERLIPIDEMIA: ICD-10-CM

## 2020-07-27 LAB
ANION GAP SERPL CALCULATED.3IONS-SCNC: 4 MMOL/L (ref 3–14)
BUN SERPL-MCNC: 16 MG/DL (ref 7–30)
CALCIUM SERPL-MCNC: 9.1 MG/DL (ref 8.5–10.1)
CHLORIDE SERPL-SCNC: 106 MMOL/L (ref 94–109)
CHOLEST SERPL-MCNC: 161 MG/DL
CO2 SERPL-SCNC: 29 MMOL/L (ref 20–32)
CREAT SERPL-MCNC: 1.17 MG/DL (ref 0.66–1.25)
GFR SERPL CREATININE-BSD FRML MDRD: 69 ML/MIN/{1.73_M2}
GLUCOSE SERPL-MCNC: 101 MG/DL (ref 70–99)
HDLC SERPL-MCNC: 53 MG/DL
LDLC SERPL CALC-MCNC: 82 MG/DL
NONHDLC SERPL-MCNC: 108 MG/DL
POTASSIUM SERPL-SCNC: 4.1 MMOL/L (ref 3.4–5.3)
SODIUM SERPL-SCNC: 139 MMOL/L (ref 133–144)
TRIGL SERPL-MCNC: 132 MG/DL

## 2020-07-27 PROCEDURE — 99213 OFFICE O/P EST LOW 20 MIN: CPT | Mod: 25 | Performed by: INTERNAL MEDICINE

## 2020-07-27 PROCEDURE — 80061 LIPID PANEL: CPT | Performed by: INTERNAL MEDICINE

## 2020-07-27 PROCEDURE — 80048 BASIC METABOLIC PNL TOTAL CA: CPT | Performed by: INTERNAL MEDICINE

## 2020-07-27 PROCEDURE — 36415 COLL VENOUS BLD VENIPUNCTURE: CPT | Performed by: INTERNAL MEDICINE

## 2020-07-27 PROCEDURE — 99396 PREV VISIT EST AGE 40-64: CPT | Performed by: INTERNAL MEDICINE

## 2020-07-27 RX ORDER — TIOTROPIUM BROMIDE AND OLODATEROL 3.124; 2.736 UG/1; UG/1
2 SPRAY, METERED RESPIRATORY (INHALATION) AT BEDTIME
Qty: 1 INHALER | Refills: 11 | Status: SHIPPED | OUTPATIENT
Start: 2020-07-27

## 2020-07-27 RX ORDER — ALBUTEROL SULFATE 90 UG/1
AEROSOL, METERED RESPIRATORY (INHALATION)
Qty: 18 G | Refills: 11 | Status: SHIPPED | OUTPATIENT
Start: 2020-07-27

## 2020-07-27 ASSESSMENT — MIFFLIN-ST. JEOR: SCORE: 1586.49
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
## 2020-07-27 NOTE — PATIENT INSTRUCTIONS
Preventive Health Recommendations  Male Ages 50 - 64    Yearly exam:             See your health care provider every year in order to  o   Review health changes.   o   Discuss preventive care.    o   Review your medicines if your doctor has prescribed any.     Have a cholesterol test every 5 years, or more frequently if you are at risk for high cholesterol/heart disease.     Have a diabetes test (fasting glucose) every three years. If you are at risk for diabetes, you should have this test more often.     Have a colonoscopy at age 50, or have a yearly FIT test (stool test). These exams will check for colon cancer.      Talk with your health care provider about whether or not a prostate cancer screening test (PSA) is right for you.    You should be tested each year for STDs (sexually transmitted diseases), if you re at risk.     Shots: Get a flu shot each year. Get a tetanus shot every 10 years.     Nutrition:    Eat at least 5 servings of fruits and vegetables daily.     Eat whole-grain bread, whole-wheat pasta and brown rice instead of white grains and rice.     Get adequate Calcium and Vitamin D.     Lifestyle    Exercise for at least 150 minutes a week (30 minutes a day, 5 days a week). This will help you control your weight and prevent disease.     Limit alcohol to one drink per day.     No smoking.     Wear sunscreen to prevent skin cancer.     See your dentist every six months for an exam and cleaning.     See your eye doctor every 1 to 2 years.    Lung Cancer Screening   Frequently Asked Questions  If you are at high-risk for lung cancer, getting screened with low-dose computed tomography (LDCT) every year can help save your life. This handout offers answers to some of the most common questions about lung cancer screening. If you have other questions, please call [phone] ([phone]).     What is it?  Lung cancer screening uses special X-ray technology to create an image of your lung tissue.  The exam is quick and easy and takes less than 10 seconds. We don t give you any medicine or use any needles. You can eat before and after the exam. You don t need to change your clothes as long as the clothing on your chest doesn t contain metal. But, you do need to be able to hold your breath for at least 6 seconds during the exam.    What is the goal of lung cancer screening?  The goal of lung cancer screening is to save lives. Many times, lung cancer is not found until a person starts having physical symptoms. Lung cancer screening can help detect lung cancer in the earliest stages when it may be easier to treat.    Who should be screened for lung cancer?  We suggest lung cancer screening for anyone who is at high-risk for lung cancer. You are in the high-risk group if you:      are between the ages of 55 and 79, and    have smoked at least 1 pack of cigarettes a day for 30 or more years, and    still smoke or have quit within the past 15 years.    However, if you have a new cough or shortness of breath, you should talk to your doctor before being screened.    Some national lung health advocacy groups also recommend screening for people ages 50 to 79 who have smoked an average of 1 pack of cigarettes a day for 20 years. They must also have at least 1 other risk factor for lung cancer, not including exposure to secondhand smoke. Other risk factors are having had cancer in the past, emphysema, pulmonary fibrosis, COPD, a family history of lung cancer, or exposure to certain materials such as arsenic, asbestos, beryllium, cadmium, chromium, diesel fumes, nickel, radon or silica. Your care team can help you know if you have one of these risk factors.     Why does it matter if I have symptoms?  Certain symptoms can be a sign that you have a condition in your lungs that should be checked and treated by your doctor. These symptoms include fever, chest pain, a new or changing cough, shortness of breath that you have  never felt before, coughing up blood or unexplained weight loss. Having any of these symptoms can greatly affect the results of lung cancer screening.       Should all smokers get an LDCT lung cancer screening exam?  It depends. Lung cancer screening is for a very specific group of men and women who have a history of heavy smoking over a long period of time (see  Who should be screened for lung cancer  above).  I am in the high-risk group, but have been diagnosed with cancer in the past. Is LDCT lung cancer screening right for me?  In some cases, you should not have LDCT lung screening, such as when your doctor is already following your cancer with CT scan studies. Your doctor will help you decide if LDCT lung screening is right for you.  Do I need to have a screening exam every year?  Yes. If you are in the high-risk group described earlier, you should get an LDCT lung cancer screening exam every year until you are 79, or are no longer willing or able to undergo screening and possible procedures to diagnose and treat lung cancer.  How effective is LDCT at preventing death from lung cancer?  Studies have shown that LDCT lung cancer screening can lower the risk of death from lung cancer by 20 percent in people who are at high-risk.  What are the risks?  There are some risks and limitations of LDCT lung cancer screening. We want to make sure you understand the risks and benefits, so please let us know if you have any questions. Your doctor may want to talk with you more about these risks.    Radiation exposure: As with any exam that uses radiation, there is a very small increased risk of cancer. The amount of radiation in LDCT is small--about the same amount a person would get from a mammogram. Your doctor orders the exam when he or she feels the potential benefits outweigh the risks.    False negatives: No test is perfect, including LDCT. It is possible that you may have a medical condition, including lung cancer,  that is not found during your exam. This is called a false negative result.    False positives and more testing: LDCT very often finds something in the lung that could be cancer, but in fact is not. This is called a false positive result. False positive tests often cause anxiety. To make sure these findings are not cancer, you may need to have more tests. These tests will be done only if you give us permission. Sometimes patients need a treatment that can have side effects, such as a biopsy. For more information on false positives, see  What can I expect from the results?     Findings not related to lung cancer: Your LDCT exam also takes pictures of areas of your body next to your lungs. In a very small number of cases, the CT scan will show an abnormal finding in one of these areas, such as your kidneys, adrenal glands, liver or thyroid. This finding may not be serious, but you may need more tests. Your doctor can help you decide what other tests you may need, if any.  What can I expect from the results?  About 1 out of 4 LDCT exams will find something that may need more tests. Most of the time, these findings are lung nodules. Lung nodules are very small collections of tissue in the lung. These nodules are very common, and the vast majority--more than 97 percent--are not cancer (benign). Most are normal lymph nodes or small areas of scarring from past infections.  But, if a small lung nodule is found to be cancer, the cancer can be cured more than 90 percent of the time. To know if the nodule is cancer, we may need to get more images before your next yearly screening exam. If the nodule has suspicious features (for example, it is large, has an odd shape or grows over time), we will refer you to a specialist for further testing.  Will my doctor also get the results?  Yes. Your doctor will get a copy of your results.  Is it okay to keep smoking now that there s a cancer screening exam?  No. Tobacco is one of the  strongest cancer-causing agents. It causes not only lung cancer, but other cancers and cardiovascular (heart) diseases as well. The damage caused by smoking builds over time. This means that the longer you smoke, the higher your risk of disease. While it is never too late to quit, the sooner you quit, the better.  Where can I find help to quit smoking?  The best way to prevent lung cancer is to stop smoking. If you have already quit smoking, congratulations and keep it up! For help on quitting smoking, please call [employer] at 7-691-731-XBYB ([phone]) or the American Cancer Society at 1-146.856.7182 to find local resources near you.  One-on-one health coaching:  If you d prefer to work individually with a health care provider on tobacco cessation, we offer:      Medication Therapy Management:  Our specially trained pharmacists work closely with you and your doctor to help you quit smoking.  Call [phone] or [phone] (toll free).     Can Do: Health coaching offered by [facility].  [email or web address]

## 2020-07-29 DIAGNOSIS — Z12.11 COLON CANCER SCREENING: ICD-10-CM

## 2020-07-29 PROCEDURE — 82274 ASSAY TEST FOR BLOOD FECAL: CPT | Performed by: INTERNAL MEDICINE

## 2020-08-05 ENCOUNTER — HOSPITAL ENCOUNTER (OUTPATIENT)
Dept: CT IMAGING | Facility: CLINIC | Age: 60
Discharge: HOME | End: 2020-08-05
Attending: INTERNAL MEDICINE | Admitting: INTERNAL MEDICINE
Payer: COMMERCIAL

## 2020-08-05 DIAGNOSIS — Z87.891 PERSONAL HISTORY OF TOBACCO USE: ICD-10-CM

## 2020-08-05 PROCEDURE — G0297 LDCT FOR LUNG CA SCREEN: HCPCS

## 2020-08-07 ENCOUNTER — TELEPHONE (OUTPATIENT)
Dept: INTERNAL MEDICINE | Facility: CLINIC | Age: 60
End: 2020-08-07

## 2020-08-07 NOTE — TELEPHONE ENCOUNTER
ADRIÁN Miller from [facility] called to report an incidental finding on 08/05/20 CT.  Report reads- Coronary  artery calcium moderate or severe.

## 2020-08-09 LAB — HEMOCCULT STL QL IA: NEGATIVE

## 2020-09-29 DIAGNOSIS — E78.2 MIXED HYPERLIPIDEMIA: ICD-10-CM

## 2020-09-29 DIAGNOSIS — K21.9 GASTROESOPHAGEAL REFLUX DISEASE WITHOUT ESOPHAGITIS: ICD-10-CM

## 2020-09-29 RX ORDER — PRAVASTATIN SODIUM 40 MG
TABLET ORAL
Qty: 28 TABLET | Refills: 9 | Status: SHIPPED | OUTPATIENT
Start: 2020-09-29 | End: 2021-06-04

## 2021-02-11 NOTE — TELEPHONE ENCOUNTER
Ok to take with current meds?   Bilobed Transposition Flap Text: The defect edges were debeveled with a #15 scalpel blade.  Given the location of the defect and the proximity to free margins a bilobed transposition flap was deemed most appropriate.  Using a sterile surgical marker, an appropriate bilobe flap drawn around the defect.    The area thus outlined was incised deep to adipose tissue with a #15 scalpel blade.  The skin margins were undermined to an appropriate distance in all directions utilizing iris scissors.

## 2021-06-04 DIAGNOSIS — E78.2 MIXED HYPERLIPIDEMIA: ICD-10-CM

## 2021-06-04 DIAGNOSIS — K21.9 GASTROESOPHAGEAL REFLUX DISEASE WITHOUT ESOPHAGITIS: ICD-10-CM

## 2021-06-04 RX ORDER — PRAVASTATIN SODIUM 40 MG
TABLET ORAL
Qty: 30 TABLET | Refills: 1 | Status: SHIPPED | OUTPATIENT
Start: 2021-06-04 | End: 2021-07-30

## 2021-07-30 DIAGNOSIS — E78.2 MIXED HYPERLIPIDEMIA: ICD-10-CM

## 2021-07-30 DIAGNOSIS — K21.9 GASTROESOPHAGEAL REFLUX DISEASE WITHOUT ESOPHAGITIS: ICD-10-CM

## 2021-07-30 RX ORDER — PRAVASTATIN SODIUM 40 MG
TABLET ORAL
Qty: 28 TABLET | Refills: 1 | Status: SHIPPED | OUTPATIENT
Start: 2021-07-30 | End: 2021-09-24

## 2021-07-30 NOTE — TELEPHONE ENCOUNTER
Routing refill request to provider for review/approval because:  Labs not current:    LDL Cholesterol Calculated   Date Value Ref Range Status   07/27/2020 82 <100 mg/dL Final     Comment:     Desirable:       <100 mg/dl       Patient needs to be seen because it has been more than 1 year since last office visit.    Augustus Lara RN  [facility] Triage Nurse

## 2021-07-30 NOTE — LETTER
[facility]  [address] [address]  [phone]    August 5, 2021      Sandro Arndt  [address] [facility] [address]      Dear Sandro Arndt    In reviewing your recent refill request for Omeprazole and Pravastatin, it was noted that you are due for a follow up appointment with your physician within the next 30 days.. Approval for a 30 day supply of the medication has been sent to your pharmacy. Additional refills will be approved during your follow up visit.    Please contact our office at [phone] to schedule your appointment.      Sincerely,      [facility]

## 2021-11-05 DIAGNOSIS — E78.2 MIXED HYPERLIPIDEMIA: ICD-10-CM

## 2021-11-05 DIAGNOSIS — K21.9 GASTROESOPHAGEAL REFLUX DISEASE WITHOUT ESOPHAGITIS: ICD-10-CM

## 2021-11-05 RX ORDER — PRAVASTATIN SODIUM 40 MG
TABLET ORAL
Qty: 28 TABLET | Refills: 0 | OUTPATIENT
Start: 2021-11-05

## 2021-11-05 NOTE — LETTER
[facility] [address] [address]  [phone]  November 12, 2021  Sandro Arndt  [address] [address]    Dear Maida,    I am contacting you regarding the refill request we received for you. After reviewing your chart it looks like you are overdue for your annual and for a med check. Please call [phone] or schedule this through my chart to continue to receive refills. If you anticipate running out before your appointment let us know and we can send in a kalani refill.       Thank you,     [facility] nursing staff

## 2021-11-05 NOTE — TELEPHONE ENCOUNTER
Routing refill request to provider for review/approval because:  Patient needs to be seen because it has been more than 1 year since last office visit.    Also routing to TC to reach out and schedule patient.    Amanda Segovia RN  [facility]

## 2021-11-11 DIAGNOSIS — E78.2 MIXED HYPERLIPIDEMIA: ICD-10-CM

## 2021-11-11 DIAGNOSIS — K21.9 GASTROESOPHAGEAL REFLUX DISEASE WITHOUT ESOPHAGITIS: ICD-10-CM

## 2021-11-11 RX ORDER — PRAVASTATIN SODIUM 40 MG
TABLET ORAL
Qty: 28 TABLET | Refills: 0 | OUTPATIENT
Start: 2021-11-11

## 2021-11-11 NOTE — TELEPHONE ENCOUNTER
This is a duplicate refill request, that has been refused to the pharmacy.   Augustus Lara RN  [facility] Triage Nurse

## 2021-12-03 DIAGNOSIS — K21.9 GASTROESOPHAGEAL REFLUX DISEASE WITHOUT ESOPHAGITIS: ICD-10-CM

## 2021-12-03 DIAGNOSIS — E78.2 MIXED HYPERLIPIDEMIA: ICD-10-CM

## 2021-12-03 NOTE — LETTER
December 17, 2021    Sandro Arndt  [address] [address]        Dear Maida,    While reviewing your refill request, we noticed that you are due to have a IN PERSON visit with your Provider.  That appointment must be made before any additional refills can be approved.     We tried to reach you unsuccessfully by telephone.  Please contact the clinic and confirm/update your information.    Taking care of your health is important to us and we look forward to seeing you in the near future.  Please call us at [phone] or [phone] (or use [facility] [employer]) to schedule.  Please disregard this notice if you have already made an appointment.        Sincerely,          [facility] Team

## 2021-12-06 NOTE — TELEPHONE ENCOUNTER
Routing refill request to provider for review/approval because:  Patient needs to be seen because it has been more than 1 year since last office visit.  LDL not current  LDL Cholesterol Calculated   Date Value Ref Range Status   07/27/2020 82 <100 mg/dL Final     Comment:     Desirable:       <100 mg/dl       Christy West RN

## 2021-12-07 RX ORDER — PRAVASTATIN SODIUM 40 MG
TABLET ORAL
Qty: 28 TABLET | Refills: 0 | OUTPATIENT
Start: 2021-12-07

## 2022-07-08 ENCOUNTER — HOSPITAL ENCOUNTER (EMERGENCY)
Facility: CLINIC | Age: 62
Discharge: HOME | End: 2022-07-08
Attending: EMERGENCY MEDICINE | Admitting: EMERGENCY MEDICINE
Payer: COMMERCIAL

## 2022-07-08 VITALS
WEIGHT: 181 LBS | OXYGEN SATURATION: 98 % | SYSTOLIC BLOOD PRESSURE: 123 MMHG | HEART RATE: 68 BPM | RESPIRATION RATE: 16 BRPM | TEMPERATURE: 98.6 F | HEIGHT: 69 IN | DIASTOLIC BLOOD PRESSURE: 67 MMHG | BODY MASS INDEX: 26.81 KG/M2

## 2022-07-08 DIAGNOSIS — F25.0 SCHIZOAFFECTIVE DISORDER, BIPOLAR TYPE (H): ICD-10-CM

## 2022-07-08 PROCEDURE — 90791 PSYCH DIAGNOSTIC EVALUATION: CPT

## 2022-07-08 PROCEDURE — 99285 EMERGENCY DEPT VISIT HI MDM: CPT | Mod: 25

## 2022-07-08 ASSESSMENT — ENCOUNTER SYMPTOMS: HALLUCINATIONS: 1

## 2022-07-08 NOTE — ED PROVIDER NOTES
EMERGENCY DEPARTMENT ENCOUNTER     NAME: Sandro Arndt   AGE: 61 year old adult   YOB: 1960   MRN: [id number]   EVALUATION DATE & TIME: 7/8/2022  2:50 PM   PCP: Cory Gleason     Chief Complaint   Patient presents with     Psychiatric Evaluation   :    FINAL IMPRESSION       1. Schizoaffective disorder, bipolar type (H)           ED COURSE & MEDICAL DECISION MAKING      3:18 PM I met with the patient, obtained history, performed an initial exam, and discussed options and plan for diagnostics and treatment here in the ED.  8:04 PM Reevaluated patient.    Pertinent Labs & Imaging studies reviewed. (See chart for details)   61 year old adult  presents to the Emergency Department for evaluation of bipolar disorder, schizoaffective disorder, requesting evaluation.  She is not suicidal or homicidal, denies current hallucinations, but states that she has missed some appointments, thinks that her meds need to be switched around because she has been tearful. Initial Vitals Reviewed. Initial exam notable for generally well-appearing patient who is calm and cooperative, tolerating oral intake, has no active suicidal or homicidal thoughts.  She was assessed by the crisis management team and at this time they are going to recommend discharge with no need for inpatient psychiatric stabilization, but will plan to follow-up with the patient's  tomorrow to help them help her remake some appointments that she missed and get set up with a therapist.  Patient is comfortable with this discharge plan.           At the conclusion of the encounter I discussed the results of all of the tests and the disposition. The questions were answered. The patient or family acknowledged understanding and was agreeable with the care plan.         MEDICATIONS GIVEN IN THE EMERGENCY:   Medications - No data to display   NEW PRESCRIPTIONS STARTED AT TODAY'S ER VISIT   New Prescriptions    No medications on file  "    ================================================================   HISTORY OF PRESENT ILLNESS       Patient information was obtained from: Patient   Use of Intrepreter: N/A    Sandro Arndt is a 61 year old adult with history of schizoaffective disorder, COPD who presents for a psych evaluation.    The patient reports going into crisis today, which included uncontrollable crying. Patient reports having these crisis episodes often, but more frequently within the past few days. Patient notes battling with bipolar, depression, and schizoaffective disorder. Patient denies any suicidal or homicidal ideations. Patient currently denies any radiating or localized aches/pains.    Patient's PCP prescribes medications. Patient called PCP to discuss a reevaluation of medications due to worsening of symptoms. Patient was advised to be seen in the ED for evaluation. Patient denies having a psychiatrist and has never been seen by one. Patient denies hallucinations or any other complaints at this time.     ================================================================    REVIEW OF SYSTEMS       Review of Systems   Psychiatric/Behavioral: Positive for hallucinations. Negative for suicidal ideas.        Negative for homicidal ideations.  Positive for \"crisis episodes.\"   All other systems reviewed and are negative.       PAST HISTORY     PAST MEDICAL HISTORY:   Past Medical History:   Diagnosis Date     Basal cell carcinoma      Borderline high cholesterol      Chronic neck pain     s/p traumatic fracture     COPD (chronic obstructive pulmonary disease) (H)      OD (overdose of drug) 6/21/2012    Overview:  With Ambien - Wanted myself to be heard     Polysubstance abuse (H)     CD Rx x 2     Pulmonary function studies abnormal     abn. DCLO     Schizoaffective disorder, bipolar type (H)      Suicidal ideation 1/23/2011     Tobacco abuse       PAST SURGICAL HISTORY:   Past Surgical History:   Procedure Laterality Date     " COLONOSCOPY N/A 6/18/2019    Procedure: COLONOSCOPY;  Surgeon: Clarence Fuentes MD;  Location:  GI     COLONOSCOPY N/A 6/27/2019    Procedure: COLONOSCOPY;  Surgeon: Clarence Fuentes MD;  Location:  GI     GENITOURINARY SURGERY       ZZC OPEN RX MANDIBLE FX        CURRENT MEDICATIONS:   albuterol (VENTOLIN HFA) 108 (90 Base) MCG/ACT inhaler  amitriptyline (ELAVIL) 100 MG tablet  ARIPiprazole (ABILIFY) 10 MG tablet  LAMICTAL 100 MG PO TABS  LamoTRIgine (LAMICTAL PO)  omeprazole (PRILOSEC) 20 MG DR capsule  pravastatin (PRAVACHOL) 40 MG tablet  tiotropium-olodaterol (STIOLTO RESPIMAT) 2.5-2.5 MCG/ACT AERS  TRAZODONE HCL PO  triamcinolone (KENALOG) 0.1 % cream      ALLERGIES:   Allergies   Allergen Reactions     Aminoglycosides Swelling     Codeine Hives     Ibuprofen GI Disturbance     Reported by group home- sick to his stomach         Neosporin Rash     Amoxicillin Nausea and Vomiting, Swelling and Rash     Per patient body swells up       Codeine Nausea and Vomiting and Nausea     Neomycin-Bacitracin-Polymyxin Hives     Neosporin      FAMILY HISTORY:   Family History   Problem Relation Age of Onset     Psychotic Disorder Mother      Depression Mother      Skin Cancer Mother      Prostate Cancer Maternal Grandfather       SOCIAL HISTORY:   Social History     Socioeconomic History     Marital status: Single   Tobacco Use     Smoking status: Current Every Day Smoker     Packs/day: 0.50     Years: 30.00     Pack years: 15.00     Types: Cigarettes     Smokeless tobacco: Never Used   Substance and Sexual Activity     Alcohol use: No     Drug use: No     Sexual activity: Not Currently     Partners: Male        VITALS  Patient Vitals for the past 24 hrs:   BP Temp Temp src Pulse Resp SpO2 Weight   07/08/22 1440 (!) 139/94 98.6  F (37  C) Oral 88 16 97 % 82.1 kg (181 lb)        ================================================================    PHYSICAL EXAM     VITAL SIGNS: BP (!) 139/94   Pulse 88   Temp 98.6  F (37   C) (Oral)   Resp 16   Wt 82.1 kg (181 lb)   SpO2 97%   BMI 26.73 kg/m     Constitutional:  Awake, no acute distress   HENT:  Atraumatic, oropharynx without exudate or erythema, membranes moist  Lymph:  No adenopathy  Eyes: EOM intact, PERRL, no injection  Neck: Supple  Respiratory:  Clear to auscultation bilaterally, no wheezes or crackles   Cardiovascular:  Regular rate and rhythm, single S1 and S2   GI:  Soft, nontender, nondistended, no rebound or guarding   Musculoskeletal:  Moves all extremities, no lower extremity edema, no deformities    Skin:  Warm, dry  Neurologic:  Alert and oriented x3, no focal deficits noted   Psychiatric: Patient is calm and cooperative. No SI, HI, or AVH.     ================================================================  LAB       All pertinent labs reviewed and interpreted.   Labs Ordered and Resulted from Time of ED Arrival to Time of ED Departure - No data to display     ===============================================================  RADIOLOGY       Reviewed all pertinent imaging. Please see official radiology report.   No orders to display         ================================================================  EKG         I have independently reviewed and interpreted the EKG(s) documented above.     ================================================================  PROCEDURES         I, Bonny Sánchez, am serving as a scribe to document services personally performed by Dr. Nicole based on my observation and the provider's statements to me. I, Eloise Nicole MD attest that Bonny Sánchez is acting in a scribe capacity, has observed my performance of the services and has documented them in accordance with my direction.     Eloise Nicole M.D.   Emergency Medicine   [facility] EMERGENCY ROOM  [address] [address]  [phone]  Dept: [phone]      Eloise Nicole MD  07/08/22 5811

## 2022-07-08 NOTE — ED TRIAGE NOTES
Here for eval. Not taking meds, here with . Bipolar depression schizoaffective D/O. Told to come to hospital for eval and med changes.
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
## 2022-07-09 NOTE — DISCHARGE INSTRUCTIONS
Aftercare Plan  If I am feeling unsafe or I am in a crisis, I will:   Contact my established care providers   Call the National Suicide Prevention Lifeline: 532.821.9488   Go to the nearest emergency room   Call 911     Warning signs that I or other people might notice when a crisis is developing for me:   Feeling suicidal, unable to complete daily activities, feeling hopeless    Things I am able to do on my own to cope or help me feel better:   Try to schedule your day with an activity you enjoy such as drawing time, listening to music  Consider joining a support group       Things that I am able to do with others to cope or help me better:   Take a walk with your staff  Listen to music together  Make a meal       Things I can use or do for distraction:   Music, drawing     Changes I can make to support my mental health and wellness:   Write appointments on calendar or put an alert on phone  Try to add one tiny habit into day (go for a walk outside)       People in my life that I can ask for help:   [facility]        Your [facility] has a mental health crisis team you can call 24/7: [facility] Mobile Crisis  [phone] (adults)  [phone] (children)      Additional resources and information:   I've left a voice message for your  requesting to assist you with rescheduling psychiatric and therapy appointments.    Ask your county worker about an [facility] worker       Crisis Lines  Crisis Text Line  Text 894978  You will be connected with a trained live crisis counselor to provide support.    Por juana, texto  ALEJANDRO a 010935 o texto a 442-AYUDAME en WhatsApp    [employer] [facility] (LGBTQ Youth Crisis Line)  9.681.948.8163  text START to 227-914      Community Resources  Fast Tracker  Linking people to mental health and substance use disorder resources  [employer]     [place] Mental Health Warm Line  Peer to peer support  Monday thru Saturday, 12 pm to 10 pm  886.848.7705 or  "9.492.312.0004  Text \"Support\" to 54214    National [place] on Mental Illness (LINDA)  638.015.7812 or 1.888.LINDA.HELPS      Mental Health Apps  My3  https://beStylish.com/    EyefreighteBox  https://Uscreen.tv.org/apps/virtual-hope-box/      Crisis Lines  Crisis Text Line  Text 920889  You will be connected with a trained live crisis counselor to provide support.    Por espanol, texto  ALEJANDRO a 854875 o texto a 442-AYUDAME en WhatsApp    National Hope Line  1.800.SUICIDE [1176240]      Community Resources  Fast Tracker  Linking people to mental health and substance use disorder resources  [employer]     [place] Mental Health Warm Line  Peer to peer support  Monday thru Saturday, 12 pm to 10 pm  865.214.0366 or 7.522.216.3105  Text \"Support\" to 33597    National [place] on Mental Illness (LINDA)  392.914.6451 or 1.888.LINDA.HELPS      Mental Health Apps  My3  https://beStylish.com/    [employer]  https://Uscreen.tv.org/apps/virtual-hope-box/      Additional Information  Today you were seen by a licensed mental health professional through Triage and Transition services, [facility] ([facility])  for a crisis assessment in the Emergency Department at [facility].  It is recommended that you follow up with your established providers (psychiatrist, mental health therapist, and/or primary care doctor - as relevant) as soon as possible. Coordinators from [facility] will be calling you in the next 24-48 hours to ensure that you have the resources you need.  You can also contact [facility] coordinators directly at [phone]. You may have been scheduled for or offered an appointment with a mental health provider. [facility] maintains an extensive network of licensed behavioral health providers to connect patients with the services they need.  We do not charge providers a fee to participate in our referral network.  We match patients with providers based on a patient's specific needs, insurance " coverage, and location.  Our first effort will be to refer you to a provider within your care system, and will utilize providers outside your care system as needed.                The [facility]  team is going to call your  tomorrow to help reschedule you appointments with your team to get medication management and to see a therapist.  They should be calling you tomorrow to schedule these appointments.

## 2022-07-11 ENCOUNTER — TELEPHONE (OUTPATIENT)
Dept: BEHAVIORAL HEALTH | Facility: CLINIC | Age: 62
End: 2022-07-11

## 2022-07-11 NOTE — TELEPHONE ENCOUNTER
First attempt at reaching patient. Left message asking for a return call to schedule with the TC. No email listed in referral and "[employer] not active.    Need to obtain appointment info for long term Psychiatry before forwarding referral to  Psychiatry for RN review. Info needed on long term therapy appointment or will need to get patient scheduled since info isn't listed in chart or referral. 2 messages sent to referral source to request appointment information, but no response.    Norma Sharma  Transition Clinic Coordinator  Date and Time: 07/11/22 8:30 AM          ----- Message from Pooja Swartz sent at 7/8/2022  9:47 PM CDT -----    Transition Clinic Referral   [place] Only   Limited [place] Availability    Type of Referral:    ____Therapy Only   ____Medication Only: Referral will automatically be declined if no next level of care scheduled.   ___x_Therapy & Medication:  Referral will automatically be declined if no next level of care scheduled.   ____Diagnostic Assessment          Psychiatry cannot see patients who do not have active medical insurance    Referring Provider Name: Pooja Swartz    Clinician completing the assessment. Pooja Swartz    Referring Provider: [facility] ([facility]) CLINICIAN     If known, referring provider contact name: Pooja Swartz; Phone Number:   Service Line/Location:     Reason for Transition Clinic Referral: Pt seeking medication eval    Next Level of Care Patient Will Be Transitioned To: pt has been seen at [facility] psychiat[facility] however has missed last appointments  Provider(s)[facility]   Location    Psychiatry cannot see patients who do not have active medical insurance    What Would Be Helpful from the Transition Clinic: pt wanting medication evaluated     Needs: NO    Does Patient Have Access to Technology: phone    Patient E-mail Address: No e-mail address on record    Current Patient Phone Number: [phone];      Clinician Gender  Preference (if applicable): NO    Pooja Swartz

## 2022-07-12 ENCOUNTER — TELEPHONE (OUTPATIENT)
Dept: BEHAVIORAL HEALTH | Facility: CLINIC | Age: 62
End: 2022-07-12

## 2022-07-12 NOTE — TELEPHONE ENCOUNTER
Second attempt to contact pt. Writer left a VM with TC contact info and encouraged a phone call back to schedule initial therapy appointment. Coordinator will sia referral as complete and will inform referral source that no appointments were scheduled wit pt due to no contact. Writer messaged referral source for next level of care for med. Tracker completed.    Jody Bishop  07/12/22  908    ----- Message from Pooja Swartz sent at 7/8/2022  9:47 PM CDT -----    Transition Clinic Referral   [place] Only   Limited [place] Availability    Type of Referral:    ____Therapy Only   ____Medication Only: Referral will automatically be declined if no next level of care scheduled.   ___x_Therapy & Medication:  Referral will automatically be declined if no next level of care scheduled.   ____Diagnostic Assessment          Psychiatry cannot see patients who do not have active medical insurance    Referring Provider Name: Pooja Swartz    Clinician completing the assessment. Pooja Swartz    Referring Provider: [facility] ([facility]) CLINICIAN     If known, referring provider contact name: Pooja Swartz; Phone Number:   Service Line/Location:     Reason for Transition Clinic Referral: Pt seeking medication eval    Next Level of Care Patient Will Be Transitioned To: pt has been seen at [facility] psychiatry however has missed last appointments  Provider(s)[facility]   Location    Psychiatry cannot see patients who do not have active medical insurance    What Would Be Helpful from the Transition Clinic: pt wanting medication evaluated     Needs: NO    Does Patient Have Access to Technology: phone    Patient E-mail Address: No e-mail address on record    Current Patient Phone Number: [phone];      Clinician Gender Preference (if applicable): NO    Pooja Swartz

## 2022-08-02 DIAGNOSIS — Z53.9 ERRONEOUS ENCOUNTER--DISREGARD: Primary | ICD-10-CM

## (undated) RX ORDER — FENTANYL CITRATE 50 UG/ML
INJECTION, SOLUTION INTRAMUSCULAR; INTRAVENOUS
Status: DISPENSED
Start: 2019-06-18

## (undated) RX ORDER — FENTANYL CITRATE 50 UG/ML
INJECTION, SOLUTION INTRAMUSCULAR; INTRAVENOUS
Status: DISPENSED
Start: 2019-06-27